# Patient Record
Sex: FEMALE | Race: BLACK OR AFRICAN AMERICAN | Employment: FULL TIME | URBAN - METROPOLITAN AREA
[De-identification: names, ages, dates, MRNs, and addresses within clinical notes are randomized per-mention and may not be internally consistent; named-entity substitution may affect disease eponyms.]

---

## 2017-01-13 ENCOUNTER — OFFICE VISIT (OUTPATIENT)
Dept: ENDOCRINOLOGY | Age: 47
End: 2017-01-13

## 2017-01-13 VITALS
SYSTOLIC BLOOD PRESSURE: 127 MMHG | DIASTOLIC BLOOD PRESSURE: 68 MMHG | BODY MASS INDEX: 33.23 KG/M2 | WEIGHT: 176 LBS | TEMPERATURE: 98.6 F | HEIGHT: 61 IN | RESPIRATION RATE: 16 BRPM | HEART RATE: 74 BPM

## 2017-01-13 DIAGNOSIS — E23.6 EMPTY SELLA SYNDROME (HCC): ICD-10-CM

## 2017-01-13 DIAGNOSIS — E03.9 HYPOTHYROIDISM, UNSPECIFIED TYPE: Primary | ICD-10-CM

## 2017-01-13 DIAGNOSIS — E78.5 HYPERLIPIDEMIA, UNSPECIFIED HYPERLIPIDEMIA TYPE: ICD-10-CM

## 2017-01-13 DIAGNOSIS — R73.03 PREDIABETES: ICD-10-CM

## 2017-01-13 RX ORDER — PRAVASTATIN SODIUM 20 MG/1
20 TABLET ORAL
Qty: 30 TAB | Refills: 6 | Status: SHIPPED | OUTPATIENT
Start: 2017-01-13 | End: 2018-01-05 | Stop reason: ALTCHOICE

## 2017-01-13 RX ORDER — LOSARTAN POTASSIUM 25 MG/1
TABLET ORAL
Refills: 3 | COMMUNITY
Start: 2016-12-29 | End: 2020-03-05 | Stop reason: SDUPTHER

## 2017-01-13 NOTE — MR AVS SNAPSHOT
Visit Information Date & Time Provider Department Dept. Phone Encounter #  
 1/13/2017  9:00 AM Mauricio Cali MD [facility] Diabetes & Endocrinology [phone] [id number] Follow-up Instructions Return in about 6 months (around 7/13/2017). Upcoming Health Maintenance Date Due DTaP/Tdap/Td series (1 - Tdap) 11/8/1993 PAP AKA CERVICAL CYTOLOGY 11/8/1993 INFLUENZA AGE 9 TO ADULT 8/1/2016 Allergies as of 1/13/2017  Review Complete On: 1/13/2017 By: Mauricio Cali MD  
  
 Severity Noted Reaction Type Reactions Imitrex [Sumatriptan Succinate]  06/20/2013    Swelling  
 tongue Current Immunizations  Never Reviewed No immunizations on file. Not reviewed this visit Vitals BP Pulse Temp Resp Height(growth percentile) Weight(growth percentile) 127/68 (BP 1 Location: Right arm, BP Patient Position: Sitting) 74 98.6 °F (37 °C) (Oral) 16 5' 1\" (1.549 m) 176 lb (79.8 kg) BMI OB Status Smoking Status 33.25 kg/m2 Unknown Never Smoker BMI and BSA Data Body Mass Index Body Surface Area  
 33.25 kg/m 2 1.85 m 2 Preferred Pharmacy Pharmacy Name Phone 310 [facility], [place] [address] [address] ([address] [phone] Your Updated Medication List  
  
   
This list is accurate as of: 1/13/17  9:33 AM.  Always use your most recent med list.  
  
  
  
  
 furosemide 40 mg tablet Commonly known as:  LASIX Take 1 Tab by mouth daily. levothyroxine 112 mcg tablet Commonly known as:  SYNTHROID Take 1 Tab by mouth Daily (before breakfast). STOP 125 MCG  
  
 losartan 25 mg tablet Commonly known as:  COZAAR TK 1 T PO  QD  
  
 metoprolol succinate 25 mg XL tablet Commonly known as:  TOPROL-XL Take 1 Tab by mouth daily. multivitamin tablet Commonly known as:  ONE A DAY Take 1 Tab by mouth daily. pravastatin 20 mg tablet Commonly known as:  PRAVACHOL Take 1 Tab by mouth nightly. Follow-up Instructions Return in about 6 months (around 7/13/2017). Patient Instructions   
 
 
 pravachol 20 mg at night Cozaar 25 mg at night Continue on synthrodi 112 mcg a day Introducing [facility] & [facility] SERVICES! Dear Alexx Espinosa: Thank you for requesting a mySugr account. Our records indicate that you already have an active mySugr account. You can access your account anytime at [email or web address] Did you know that you can access your hospital and ER discharge instructions at any time in mySugr? You can also review all of your test results from your hospital stay or ER visit. Additional Information If you have questions, please visit the Frequently Asked Questions section of the mySugr website at [email or web address] Remember, mySugr is NOT to be used for urgent needs. For medical emergencies, dial 911. Now available from your iPhone and Android! Please provide this summary of care documentation to your next provider. Your primary care clinician is listed as Mauricio Guerrero. If you have any questions after today's visit, please call [phone].

## 2017-01-13 NOTE — PROGRESS NOTES
HISTORY OF PRESENT ILLNESS   Raman Tovar is a 42 y.o. female. 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 Singh Persons  She could nto continue on herceptin because of CHF  No hospitalizations     tolerating 112 mcg a day   She stopped the [facility] treatments    She other wise feels good     Weight gain of 11  lb   Not exercising     compliant with meds   Tolerating them with no issues         Review of Systems   Constitutional: Negative. HENT: Negative. Eyes: Negative for pain and redness. Respiratory: Negative. Cardiovascular: Negative for chest pain, palpitations and leg swelling. Gastrointestinal: Negative. Negative for constipation. Genitourinary: Negative. Musculoskeletal: Negative for myalgias. Skin: Negative. Neurological: Negative. Endo/Heme/Allergies: Negative. Psychiatric/Behavioral: Negative for depression and memory loss. The patient does not have insomnia. Physical Exam   Constitutional: She is oriented to person, place, and time. She appears well-developed and well-nourished. HENT:   Head: Normocephalic. Eyes: Conjunctivae and extraocular motions are normal. Pupils are equal, round, and reactive to light. Neck: Normal range of motion. Neck supple. No JVD present. No tracheal deviation present. No thyromegaly present. Cardiovascular: Normal rate, regular rhythm and normal heart sounds. No murmur heard. Pulmonary/Chest: Breath sounds normal.   Abdominal: Soft. Bowel sounds are normal.   Musculoskeletal: Normal range of motion. Lymphadenopathy:   She has no cervical adenopathy. Neurological: She is alert and oriented to person, place, and time. She has normal reflexes. ASSESSMENT AND PLAN:       1. 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  She claims compliance with synthroid and her labs are slightly off ... reaching sub-clinical hyperthyroidism   As she has this condition, will ignore and maintain the same dose   She will continue on Synthroid 125 mcg. - suggesting to [facility] . She does have suppressed TSH, but continuing on same dose   Since she got the reduced dose of 112 mcg she is doing fine, she has no palpitations , continuing on the same dose         b. Hypogonadism. The patient had no help on estrace 1 mg and prometrium 100 mg(changed as pt expressed that ocps remind her of fertility issues) in place of Amberly Vania ,   so she changed to another OCP- generic quan , she stopped it  for fear of DVT      c. Growth hormone deficiency. She stopped it as she is diagnosed with Breast cancer sept 2015       D. ACTH stim test- she passed  The test  indicating no adrenal insufficiency   DIZZY SPELLS- disappeared on their own       2. CHF - from hirceptin  On toprol xl   F/u with Dr. Pedro Moss      3. osteopenia ; and she is off  OCP   Continue on calcium supplementation. s/p toe fracture on left pinky toe     4,. Hyperlipidemia : there is a 30 point improvement   On flax seed and compliance with  pravachol 20 mg at night encouraged            5. Breast cancer , left side, lumpectomy   BRCA positive stage 1  Had  local irradiation   She finished chemo   Being HR positive, she could not take herceptin because of CHF   No Lymphnodes          6. Pre diabetic : a1c is  6 %    ? stereoids ?  Stress     > 50 % of time is spent on counseling         Labs bnv

## 2017-01-13 NOTE — PROGRESS NOTES
Wt Readings from Last 3 Encounters:   01/13/17 176 lb (79.8 kg)   06/30/16 165 lb (74.8 kg)   01/08/16 174 lb (78.9 kg)     Temp Readings from Last 3 Encounters:   01/13/17 98.6 °F (37 °C) (Oral)   06/30/16 97.4 °F (36.3 °C) (Oral)   01/08/16 98.8 °F (37.1 °C) (Oral)     BP Readings from Last 3 Encounters:   01/13/17 127/68   06/30/16 109/78   01/08/16 136/89     Pulse Readings from Last 3 Encounters:   01/13/17 74   06/30/16 100   01/08/16 69     Lab Results   Component Value Date/Time    Hemoglobin A1c 6.0 01/05/2017 08:19 AM       Order placed for pt,  per Verbal Order with read back from Dr Kimmy Cason on 1/13/2017.

## 2017-01-14 LAB
CHOLEST SERPL-MCNC: 242 MG/DL (ref 100–199)
HDLC SERPL-MCNC: 84 MG/DL
INTERPRETATION, 910389: NORMAL
LDLC SERPL CALC-MCNC: 138 MG/DL (ref 0–99)
TRIGL SERPL-MCNC: 98 MG/DL (ref 0–149)
VLDLC SERPL CALC-MCNC: 20 MG/DL (ref 5–40)

## 2017-09-07 ENCOUNTER — OP HISTORICAL/CONVERTED ENCOUNTER (OUTPATIENT)
Dept: OTHER | Age: 47
End: 2017-09-07

## 2017-12-22 ENCOUNTER — TELEPHONE (OUTPATIENT)
Dept: ENDOCRINOLOGY | Age: 47
End: 2017-12-22

## 2017-12-22 DIAGNOSIS — E78.5 HYPERLIPIDEMIA, UNSPECIFIED HYPERLIPIDEMIA TYPE: ICD-10-CM

## 2017-12-22 DIAGNOSIS — E23.6 EMPTY SELLA SYNDROME (HCC): ICD-10-CM

## 2017-12-22 DIAGNOSIS — E03.8 OTHER SPECIFIED HYPOTHYROIDISM: ICD-10-CM

## 2017-12-22 DIAGNOSIS — R73.03 PREDIABETES: ICD-10-CM

## 2017-12-22 DIAGNOSIS — E23.0 GHD (GROWTH HORMONE DEFICIENCY) (HCC): Primary | ICD-10-CM

## 2018-01-05 ENCOUNTER — OFFICE VISIT (OUTPATIENT)
Dept: ENDOCRINOLOGY | Age: 48
End: 2018-01-05

## 2018-01-05 VITALS
TEMPERATURE: 97.8 F | WEIGHT: 181 LBS | RESPIRATION RATE: 16 BRPM | BODY MASS INDEX: 34.17 KG/M2 | OXYGEN SATURATION: 99 % | SYSTOLIC BLOOD PRESSURE: 149 MMHG | HEIGHT: 61 IN | HEART RATE: 70 BPM | DIASTOLIC BLOOD PRESSURE: 85 MMHG

## 2018-01-05 DIAGNOSIS — I10 ESSENTIAL HYPERTENSION: ICD-10-CM

## 2018-01-05 DIAGNOSIS — E23.6 EMPTY SELLA SYNDROME (HCC): Primary | ICD-10-CM

## 2018-01-05 DIAGNOSIS — E78.5 HYPERLIPIDEMIA, UNSPECIFIED HYPERLIPIDEMIA TYPE: ICD-10-CM

## 2018-01-05 DIAGNOSIS — L68.0 HIRSUTISM: ICD-10-CM

## 2018-01-05 RX ORDER — ROSUVASTATIN CALCIUM 10 MG/1
10 TABLET, COATED ORAL
Qty: 30 TAB | Refills: 6 | Status: SHIPPED | OUTPATIENT
Start: 2018-01-05 | End: 2018-10-30 | Stop reason: SDUPTHER

## 2018-01-05 RX ORDER — MELATONIN
DAILY
COMMUNITY
End: 2021-11-01

## 2018-01-05 RX ORDER — SPIRONOLACTONE 25 MG/1
25 TABLET ORAL DAILY
Qty: 60 TAB | Refills: 6 | Status: SHIPPED | OUTPATIENT
Start: 2018-01-05 | End: 2018-10-30 | Stop reason: ALTCHOICE

## 2018-01-05 RX ORDER — LEVOTHYROXINE SODIUM 112 UG/1
TABLET ORAL
Qty: 30 TAB | Refills: 6 | Status: SHIPPED | OUTPATIENT
Start: 2018-01-05 | End: 2018-10-30 | Stop reason: SDUPTHER

## 2018-01-05 NOTE — MR AVS SNAPSHOT
Visit Information Date & Time Provider Department Dept. Phone Encounter #  
 1/5/2018  9:45 AM Chick Lombard, MD [facility] Diabetes & Endocrinology [phone] [id number] Follow-up Instructions Return in about 6 months (around 7/5/2018). Upcoming Health Maintenance Date Due DTaP/Tdap/Td series (1 - Tdap) 11/8/1993 PAP AKA CERVICAL CYTOLOGY 11/8/1993 Influenza Age 5 to Adult 8/1/2017 Allergies as of 1/5/2018  Review Complete On: 1/5/2018 By: Chick Lombard, MD  
  
 Severity Noted Reaction Type Reactions Imitrex [Sumatriptan Succinate]  06/20/2013    Swelling  
 tongue Current Immunizations  Never Reviewed No immunizations on file. Not reviewed this visit You Were Diagnosed With   
  
 Codes Comments Empty sella syndrome ([facility]    -  Primary ICD-10-CM: E23.6 ICD-9-CM: 253.8 Hyperlipidemia, unspecified hyperlipidemia type     ICD-10-CM: E78.5 ICD-9-CM: 272.4 Essential hypertension     ICD-10-CM: I10 
ICD-9-CM: 401.9 Hirsutism     ICD-10-CM: L68.0 ICD-9-CM: 704.1 Vitals BP Pulse Temp Resp Height(growth percentile) Weight(growth percentile) 149/85 (BP 1 Location: Right arm, BP Patient Position: Sitting) 70 97.8 °F (36.6 °C) (Oral) 16 5' 1\" (1.549 m) 181 lb (82.1 kg) SpO2 BMI OB Status Smoking Status 99% 34.2 kg/m2 Unknown Never Smoker BMI and BSA Data Body Mass Index Body Surface Area  
 34.2 kg/m 2 1.88 m 2 Preferred Pharmacy Pharmacy Name Phone 310 [facility] 53 91 [facility] OF [address] [place] [address] ([address] [phone] Your Updated Medication List  
  
   
This list is accurate as of: 1/5/18 11:06 AM.  Always use your most recent med list.  
  
  
  
  
 furosemide 40 mg tablet Commonly known as:  LASIX Take 1 Tab by mouth daily. levothyroxine 112 mcg tablet Commonly known as:  SYNTHROID  
 TAKE 1 TABLET BY MOUTH EVERY DAY BEFORE BREAKFAST  
  
 losartan 25 mg tablet Commonly known as:  COZAAR TK 1 T PO  QD  
  
 metoprolol succinate 25 mg XL tablet Commonly known as:  TOPROL-XL Take 1 Tab by mouth daily. multivitamin tablet Commonly known as:  ONE A DAY Take 1 Tab by mouth daily. rosuvastatin 10 mg tablet Commonly known as:  CRESTOR Take 1 Tab by mouth nightly. Stop pravachol  
  
 spironolactone 25 mg tablet Commonly known as:  ALDACTONE Take 1 Tab by mouth daily. VITAMIN D3 1,000 unit tablet Generic drug:  cholecalciferol Take  by mouth daily. Prescriptions Sent to Pharmacy Refills  
 rosuvastatin (CRESTOR) 10 mg tablet 6 Sig: Take 1 Tab by mouth nightly. Stop pravachol Class: Normal  
 Pharmacy: [address] #: [phone] Route: Oral  
 spironolactone (ALDACTONE) 25 mg tablet 6 Sig: Take 1 Tab by mouth daily. Class: Normal  
 Pharmacy: [address] Ph #: [phone] Route: Oral  
  
Follow-up Instructions Return in about 6 months (around 7/5/2018). Patient Instructions Salivary swabs at 11 pm  
----------------------------------------------------------------------------------------- Start on spironolactone 25 mg twice a day  
 
 
 
 change pravachol  To  crestor 10 mg at night Cozaar 25 mg at night Continue on synthrodi 112 mcg a day  
on empty stomach with water only, no other meds or food or drinks   For next half hour Take any kind of vitamins, calcium, iron   Pills  4 hours later Introducing [facility] & HEALTH SERVICES! Dear Lizzy Rosas: Thank you for requesting a YODIL account. Our records indicate that you already have an active YODIL account.   You can access your account anytime at [email or web address] Did you know that you can access your hospital and ER discharge instructions at any time in Classkick? You can also review all of your test results from your hospital stay or ER visit. Additional Information If you have questions, please visit the Frequently Asked Questions section of the Classkick website at [email or web address] Remember, Classkick is NOT to be used for urgent needs. For medical emergencies, dial 911. Now available from your iPhone and Android! Please provide this summary of care documentation to your next provider. Your primary care clinician is listed as [facility] Ivana. If you have any questions after today's visit, please call [phone].

## 2018-01-05 NOTE — PROGRESS NOTES
HISTORY OF PRESENT ILLNESS   Trevor Bacon is a 41 y.o. female. HPI   F/u visit for pan hypo pituitarism after jan 2017    She got diagnosed with breast cancer, Finished chemo and radiation   She f/u with Dr. Vernona Krabbe  She could nto continue on herceptin because of CHF ( sort of complete pre [facility] studies )  No hospitalizations     tolerating 112 mcg a day   She stopped the [address] treatments    She other wise feels good     Weight gain of 5  lb   Not exercising     compliant with meds   Tolerating them with no issues         Review of Systems   Constitutional: Negative. HENT: Negative. Eyes: Negative for pain and redness. Respiratory: Negative. Cardiovascular: Negative for chest pain, palpitations and leg swelling. Gastrointestinal: Negative. Negative for constipation. Genitourinary: Negative. Musculoskeletal: Negative for myalgias. Skin: Negative. Neurological: Negative. Endo/Heme/Allergies: Negative. Psychiatric/Behavioral: Negative for depression and memory loss. The patient does not have insomnia. Physical Exam   Constitutional: She is oriented to person, place, and time. She appears well-developed and well-nourished. HENT:   Head: Normocephalic. Eyes: Conjunctivae and extraocular motions are normal. Pupils are equal, round, and reactive to light. Neck: Normal range of motion. Neck supple. No JVD present. No tracheal deviation present. No thyromegaly present. Cardiovascular: Normal rate, regular rhythm and normal heart sounds. No murmur heard. Pulmonary/Chest: Breath sounds normal.   Abdominal: Soft. Bowel sounds are normal.   Musculoskeletal: Normal range of motion. Lymphadenopathy:   She has no cervical adenopathy. Neurological: She is alert and oriented to person, place, and time. She has normal reflexes. Reviewed labs       ASSESSMENT AND PLAN:       1. 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  She claims compliance with synthroid and her labs are slightly off . .. reaching sub-clinical hyperthyroidism   As she has this condition, will ignore and maintain the same dose   She will continue on Synthroid 125 mcg. - suggesting to [facility] . She does have suppressed TSH, but continuing on same dose   Since she got the reduced dose of 112 mcg she is doing fine, she has no palpitations , continuing on the same dose         b. Hypogonadism. The patient had no help on estrace 1 mg and prometrium 100 mg(changed as pt expressed that ocps remind her of fertility issues) in place of Alphonza Andreina ,   so she changed to another OCP- generic quan , she stopped it  for fear of DVT      c. Growth hormone deficiency. She stopped it as she is diagnosed with Breast cancer sept 2015       D. ACTH stim test- she passed  The test  indicating no adrenal insufficiency   DIZZY SPELLS- disappeared on their own       2. CHF - from hirceptin  On toprol xl   F/u with Dr. Cain Mao at [facility]       3. osteopenia ; and she is off  OCP   Continue on calcium supplementation. s/p toe fracture on left pinky toe       4,. Hyperlipidemia : off   Recommending change to crestor 10 mg at night   On flax seed and compliance with  pravachol 20 mg at night encouraged            5. Breast cancer , left side, lumpectomy   BRCA positive stage 1  Had  local irradiation   She finished chemo   Being HR positive, she could not take herceptin because of CHF   No Lymphnodes          6. Pre diabetic : a1c is  6 %    ? stereoids ? Stress         7.  BRCA 2 positive - going for ENRIQUE and BSLO      8. Hirsutism : on spironolactone       Labs bnv  Rule out cushings disease    > 50 % of time is spent on counseling   Patient voiced understanding her plan of care

## 2018-01-05 NOTE — PATIENT INSTRUCTIONS
Salivary swabs at 11 pm   -----------------------------------------------------------------------------------------    Start on spironolactone 25 mg twice a day          change pravachol  To  crestor 10 mg at night       Cozaar 25 mg at night      Continue on synthrodi 112 mcg a day   on empty stomach with water only, no other meds or food or drinks   For next half hour       Take any kind of vitamins, calcium, iron   Pills  4 hours later

## 2018-01-18 DIAGNOSIS — I10 ESSENTIAL HYPERTENSION: ICD-10-CM

## 2018-01-18 DIAGNOSIS — L68.0 HIRSUTISM: ICD-10-CM

## 2018-01-18 DIAGNOSIS — E23.6 EMPTY SELLA SYNDROME (HCC): ICD-10-CM

## 2018-01-18 DIAGNOSIS — E78.5 HYPERLIPIDEMIA, UNSPECIFIED HYPERLIPIDEMIA TYPE: ICD-10-CM

## 2018-01-27 LAB
CORTIS BS SAL-MCNC: 0.03 UG/DL
CORTIS SP1 P CHAL SAL-MCNC: 0.01 UG/DL

## 2018-07-05 ENCOUNTER — TELEPHONE (OUTPATIENT)
Dept: ENDOCRINOLOGY | Age: 48
End: 2018-07-05

## 2018-07-05 DIAGNOSIS — L68.0 HIRSUTISM: ICD-10-CM

## 2018-07-05 DIAGNOSIS — R73.03 PREDIABETES: ICD-10-CM

## 2018-07-05 DIAGNOSIS — E78.5 HYPERLIPIDEMIA, UNSPECIFIED HYPERLIPIDEMIA TYPE: Primary | ICD-10-CM

## 2018-07-05 NOTE — TELEPHONE ENCOUNTER
----- Message from [facility] sent at 7/5/2018  1:47 PM EDT -----  Regarding: Dr. Pillo Gonzalez  Pt is requesting the office to r/s both her lab and f/u appt with Dr. Emelina Conteh?  Pt best contact [phone]

## 2018-07-05 NOTE — TELEPHONE ENCOUNTER
Lab orders available for  at     Order placed for pt,  per Verbal Order with read back from Dr Keyon Garcia on 7/5/2018.

## 2018-07-09 ENCOUNTER — OP HISTORICAL/CONVERTED ENCOUNTER (OUTPATIENT)
Dept: OTHER | Age: 48
End: 2018-07-09

## 2018-09-28 ENCOUNTER — OP HISTORICAL/CONVERTED ENCOUNTER (OUTPATIENT)
Dept: OTHER | Age: 48
End: 2018-09-28

## 2018-10-23 ENCOUNTER — OP HISTORICAL/CONVERTED ENCOUNTER (OUTPATIENT)
Dept: OTHER | Age: 48
End: 2018-10-23

## 2018-10-30 ENCOUNTER — OFFICE VISIT (OUTPATIENT)
Dept: ENDOCRINOLOGY | Age: 48
End: 2018-10-30

## 2018-10-30 VITALS
WEIGHT: 179.4 LBS | SYSTOLIC BLOOD PRESSURE: 143 MMHG | OXYGEN SATURATION: 98 % | RESPIRATION RATE: 18 BRPM | BODY MASS INDEX: 33.87 KG/M2 | TEMPERATURE: 98 F | HEART RATE: 65 BPM | DIASTOLIC BLOOD PRESSURE: 80 MMHG | HEIGHT: 61 IN

## 2018-10-30 DIAGNOSIS — E23.6 EMPTY SELLA SYNDROME (HCC): ICD-10-CM

## 2018-10-30 DIAGNOSIS — E23.6 EMPTY SELLA SYNDROME (HCC): Primary | ICD-10-CM

## 2018-10-30 DIAGNOSIS — E03.9 HYPOTHYROIDISM, UNSPECIFIED TYPE: ICD-10-CM

## 2018-10-30 DIAGNOSIS — L68.0 HIRSUTISM: ICD-10-CM

## 2018-10-30 DIAGNOSIS — M85.80 OSTEOPENIA, UNSPECIFIED LOCATION: ICD-10-CM

## 2018-10-30 DIAGNOSIS — E78.2 MIXED HYPERLIPIDEMIA: ICD-10-CM

## 2018-10-30 DIAGNOSIS — E78.5 HYPERLIPIDEMIA, UNSPECIFIED HYPERLIPIDEMIA TYPE: ICD-10-CM

## 2018-10-30 DIAGNOSIS — I10 ESSENTIAL HYPERTENSION: ICD-10-CM

## 2018-10-30 DIAGNOSIS — E23.0 GHD (GROWTH HORMONE DEFICIENCY) (HCC): ICD-10-CM

## 2018-10-30 RX ORDER — LEVOTHYROXINE SODIUM 112 UG/1
TABLET ORAL
Qty: 90 TAB | Refills: 4 | Status: SHIPPED | OUTPATIENT
Start: 2018-10-30 | End: 2020-01-18

## 2018-10-30 RX ORDER — ROSUVASTATIN CALCIUM 10 MG/1
10 TABLET, COATED ORAL
Qty: 90 TAB | Refills: 4 | Status: SHIPPED | OUTPATIENT
Start: 2018-10-30 | End: 2020-01-18

## 2018-10-30 NOTE — PROGRESS NOTES
1. Have you been to the ER, urgent care clinic since your last visit? Hospitalized since your last visit? Yes June 21st at [facility] [place] for broken ankle      2. Have you seen or consulted any other health care providers outside of the [facility] since your last visit? Include any pap smears or colon screening.  No      Chief Complaint   Patient presents with    Thyroid Problem     follow up-patient states she had a few missed appoitments     Wt Readings from Last 3 Encounters:   10/30/18 179 lb 6.4 oz (81.4 kg)   01/05/18 181 lb (82.1 kg)   01/13/17 176 lb (79.8 kg)     Temp Readings from Last 3 Encounters:   10/30/18 98 °F (36.7 °C) (Oral)   01/05/18 97.8 °F (36.6 °C) (Oral)   01/13/17 98.6 °F (37 °C) (Oral)     BP Readings from Last 3 Encounters:   10/30/18 143/80   01/05/18 149/85   01/13/17 127/68     Pulse Readings from Last 3 Encounters:   10/30/18 65   01/05/18 70   01/13/17 74

## 2018-10-30 NOTE — PATIENT INSTRUCTIONS
Stop spironolactone  crestor 10 mg at night   Cozaar 25 mg at night      --------------------------------------------------------------------------------------------------------------------    Continue on synthrodi 112 mcg a day   on empty stomach with water only, no other meds or food or drinks   For next half hour       Take any kind of vitamins, calcium, iron   Pills  4 hours later

## 2018-10-30 NOTE — PROGRESS NOTES
HISTORY OF PRESENT ILLNESS   Pramod Graf is a 41 y.o. female. HPI   F/u visit for pan hypo pituitarism after jan 2018         She had broken ankle , right side   She fell at work     Had to wait until she healed up           Old history :    She got diagnosed with breast cancer, Finished chemo and radiation   She f/u with Dr. Glendy Rayo  She could nto continue on herceptin because of CHF ( sort of complete pre [facility] studies )  No hospitalizations     tolerating 112 mcg a day   She [facility] the [address]    She other wise feels good     Weight gain of 5  lb   Not exercising     compliant with meds   Tolerating them with no issues         Review of Systems   Constitutional: Negative. HENT: Negative. Eyes: Negative for pain and redness. Respiratory: Negative. Cardiovascular: Negative for chest pain, palpitations and leg swelling. Gastrointestinal: Negative. Negative for constipation. Genitourinary: Negative. Musculoskeletal: Negative for myalgias. Skin: Negative. Neurological: Negative. Endo/Heme/Allergies: Negative. Psychiatric/Behavioral: Negative for depression and memory loss. The patient does not have insomnia. Physical Exam   Constitutional: She is oriented to person, place, and time. She appears well-developed and well-nourished. HENT:   Head: Normocephalic. Eyes: Conjunctivae and extraocular motions are normal. Pupils are equal, round, and reactive to light. Neck: Normal range of motion. Neck supple. No JVD present. No tracheal deviation present. No thyromegaly present. Cardiovascular: Normal rate, regular rhythm and normal heart sounds. No murmur heard. Pulmonary/Chest: Breath sounds normal.   Abdominal: Soft. Bowel sounds are normal.   Musculoskeletal: Normal range of motion. Lymphadenopathy:   She has no cervical adenopathy. Neurological: She is alert and oriented to person, place, and time. She has normal reflexes.        Reviewed labs       ASSESSMENT AND PLAN:       1. 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 She claims compliance with synthroid and her labs are slightly off . .. reaching sub-clinical hyperthyroidism   As she has this condition, will ignore and maintain the same dose   She will continue on Synthroid 125 mcg. - suggesting to [facility] . She does have suppressed TSH, but continuing on same dose   Since she got the reduced dose of 112 mcg she is doing fine, she has no palpitations , continuing on the same dose         b. Hypogonadism. The patient had no help on estrace 1 mg and prometrium 100 mg(changed as pt expressed that ocps remind her of fertility issues) in place of Kami Kristin ,   so she changed to another OCP- generic quan , she stopped it  for fear of DVT  And ofcourse never could get back because of breast cancer      c. Growth hormone deficiency. She stopped it as she is diagnosed with Breast cancer sept 2015       D. ACTH stim test- she passed  The test  indicating no adrenal insufficiency   DIZZY SPELLS- disappeared on their own       2. CHF - from hirceptin  On toprol xl   F/u with [facility]. Vijaya Vega at [facility]       3. osteopenia ; and she is off  OCP   Continue on calcium supplementation. s/p toe fracture on left pinky toe  (Traumatic )  S/p ankle fracture on right side ( had a fall )        4,. Hyperlipidemia : off   on crestor 10 mg at night   On flax seed and compliance with  pravachol 20 mg at night encouraged            5. Breast cancer , left side, lumpectomy   BRCA positive stage 1  Had  local irradiation   She finished chemo   Being HR positive, she could not take herceptin because of CHF   No Lymphnodes          6. Pre diabetic : a1c is  6 %    ? stereoids ? Stress         7.  BRCA 2 positive - going for ENRIQUE and BSLO      8. Hirsutism : on spironolactone       Labs bnv      > 50 % of time is spent on counseling   Patient voiced understanding her plan of care

## 2019-06-07 ENCOUNTER — OP HISTORICAL/CONVERTED ENCOUNTER (OUTPATIENT)
Dept: OTHER | Age: 49
End: 2019-06-07

## 2019-12-31 ENCOUNTER — OP HISTORICAL/CONVERTED ENCOUNTER (OUTPATIENT)
Dept: OTHER | Age: 49
End: 2019-12-31

## 2020-01-18 DIAGNOSIS — E23.6 EMPTY SELLA SYNDROME (HCC): ICD-10-CM

## 2020-01-18 DIAGNOSIS — L68.0 HIRSUTISM: ICD-10-CM

## 2020-01-18 DIAGNOSIS — I10 ESSENTIAL HYPERTENSION: ICD-10-CM

## 2020-01-18 DIAGNOSIS — E78.5 HYPERLIPIDEMIA, UNSPECIFIED HYPERLIPIDEMIA TYPE: ICD-10-CM

## 2020-01-18 RX ORDER — ROSUVASTATIN CALCIUM 10 MG/1
10 TABLET, COATED ORAL
Qty: 90 TAB | Refills: 4 | Status: SHIPPED | OUTPATIENT
Start: 2020-01-18 | End: 2021-04-30 | Stop reason: SDUPTHER

## 2020-01-18 RX ORDER — LEVOTHYROXINE SODIUM 112 UG/1
TABLET ORAL
Qty: 90 TAB | Refills: 4 | Status: SHIPPED | OUTPATIENT
Start: 2020-01-18 | End: 2021-04-30 | Stop reason: SDUPTHER

## 2020-02-14 ENCOUNTER — TELEPHONE (OUTPATIENT)
Dept: ENDOCRINOLOGY | Age: 50
End: 2020-02-14

## 2020-02-14 DIAGNOSIS — E23.0 GHD (GROWTH HORMONE DEFICIENCY) (HCC): ICD-10-CM

## 2020-02-14 DIAGNOSIS — E23.6 EMPTY SELLA SYNDROME (HCC): Primary | ICD-10-CM

## 2020-02-14 DIAGNOSIS — E78.5 HYPERLIPIDEMIA, UNSPECIFIED HYPERLIPIDEMIA TYPE: ICD-10-CM

## 2020-02-14 DIAGNOSIS — L68.0 HIRSUTISM: ICD-10-CM

## 2020-02-14 DIAGNOSIS — I10 ESSENTIAL HYPERTENSION: ICD-10-CM

## 2020-02-18 ENCOUNTER — APPOINTMENT (OUTPATIENT)
Dept: ENDOCRINOLOGY | Age: 50
End: 2020-02-18

## 2020-02-18 DIAGNOSIS — I10 ESSENTIAL HYPERTENSION: ICD-10-CM

## 2020-02-18 DIAGNOSIS — L68.0 HIRSUTISM: ICD-10-CM

## 2020-02-18 DIAGNOSIS — E23.6 EMPTY SELLA SYNDROME (HCC): Primary | ICD-10-CM

## 2020-02-18 DIAGNOSIS — E78.5 HYPERLIPIDEMIA, UNSPECIFIED HYPERLIPIDEMIA TYPE: ICD-10-CM

## 2020-02-18 DIAGNOSIS — E23.0 GHD (GROWTH HORMONE DEFICIENCY) (HCC): ICD-10-CM

## 2020-02-19 LAB
ALBUMIN SERPL-MCNC: 4.8 G/DL (ref 3.8–4.8)
ALBUMIN/GLOB SERPL: 1.5 {RATIO} (ref 1.2–2.2)
ALP SERPL-CCNC: 72 IU/L (ref 39–117)
ALT SERPL-CCNC: 12 IU/L (ref 0–32)
AST SERPL-CCNC: 15 IU/L (ref 0–40)
BILIRUB SERPL-MCNC: 0.3 MG/DL (ref 0–1.2)
BUN SERPL-MCNC: 15 MG/DL (ref 6–24)
BUN/CREAT SERPL: 18 (ref 9–23)
CALCIUM SERPL-MCNC: 9.8 MG/DL (ref 8.7–10.2)
CHLORIDE SERPL-SCNC: 107 MMOL/L (ref 96–106)
CHOLEST SERPL-MCNC: 157 MG/DL (ref 100–199)
CO2 SERPL-SCNC: 20 MMOL/L (ref 20–29)
CREAT SERPL-MCNC: 0.84 MG/DL (ref 0.57–1)
EST. AVERAGE GLUCOSE BLD GHB EST-MCNC: 108 MG/DL
GH SERPL-MCNC: <0.1 NG/ML (ref 0–10)
GLOBULIN SER CALC-MCNC: 3.3 G/DL (ref 1.5–4.5)
GLUCOSE SERPL-MCNC: 83 MG/DL (ref 65–99)
HBA1C MFR BLD: 5.4 % (ref 4.8–5.6)
HDLC SERPL-MCNC: 69 MG/DL
INTERPRETATION, 910389: NORMAL
LDLC SERPL CALC-MCNC: 78 MG/DL (ref 0–99)
POTASSIUM SERPL-SCNC: 4.5 MMOL/L (ref 3.5–5.2)
PROT SERPL-MCNC: 8.1 G/DL (ref 6–8.5)
SODIUM SERPL-SCNC: 142 MMOL/L (ref 134–144)
T4 FREE SERPL-MCNC: 1.34 NG/DL (ref 0.82–1.77)
TRIGL SERPL-MCNC: 48 MG/DL (ref 0–149)
TSH SERPL DL<=0.005 MIU/L-ACNC: 0.11 UIU/ML (ref 0.45–4.5)
VLDLC SERPL CALC-MCNC: 10 MG/DL (ref 5–40)

## 2020-03-05 ENCOUNTER — OFFICE VISIT (OUTPATIENT)
Dept: ENDOCRINOLOGY | Age: 50
End: 2020-03-05

## 2020-03-05 VITALS
SYSTOLIC BLOOD PRESSURE: 131 MMHG | BODY MASS INDEX: 34.21 KG/M2 | DIASTOLIC BLOOD PRESSURE: 85 MMHG | TEMPERATURE: 96.6 F | WEIGHT: 181.2 LBS | OXYGEN SATURATION: 100 % | HEIGHT: 61 IN | HEART RATE: 67 BPM | RESPIRATION RATE: 18 BRPM

## 2020-03-05 DIAGNOSIS — E78.5 HYPERLIPIDEMIA, UNSPECIFIED HYPERLIPIDEMIA TYPE: ICD-10-CM

## 2020-03-05 DIAGNOSIS — E23.6 EMPTY SELLA SYNDROME (HCC): Primary | ICD-10-CM

## 2020-03-05 DIAGNOSIS — M81.0 SENILE OSTEOPOROSIS: ICD-10-CM

## 2020-03-05 DIAGNOSIS — E03.9 HYPOTHYROIDISM, UNSPECIFIED TYPE: ICD-10-CM

## 2020-03-05 DIAGNOSIS — R73.03 PREDIABETES: ICD-10-CM

## 2020-03-05 DIAGNOSIS — E78.2 MIXED HYPERLIPIDEMIA: ICD-10-CM

## 2020-03-05 DIAGNOSIS — E23.6 EMPTY SELLA SYNDROME (HCC): ICD-10-CM

## 2020-03-05 RX ORDER — TOPIRAMATE 50 MG/1
50 TABLET, FILM COATED ORAL 2 TIMES DAILY
COMMUNITY
End: 2020-09-02 | Stop reason: ALTCHOICE

## 2020-03-05 NOTE — LETTER
3/8/20 Patient: Murphy Dean YOB: 1970 Date of Visit: 3/5/2020 Daron Del Cid MD 
[address] [facility] [address] [place] [address] VIA Facsimile: [phone] Dear Daron Del Cid MD, Thank you for referring Ms. Vidal Scruggs to [id number] [place] [address] for evaluation. My notes for this consultation are attached. If you have questions, please do not hesitate to call me. I look forward to following your patient along with you. Sincerely, Holden Espinoza MD

## 2020-03-05 NOTE — PROGRESS NOTES
1. Have you been to the ER, urgent care clinic since your last visit? December 2019/[facility]  Hospitalized since your last visit? No    2. Have you seen or consulted any other health care providers outside of the [address] [place] [address] since your last visit? Include any pap smears or colon screening.  No    Wt Readings from Last 3 Encounters:   03/05/20 181 lb 3.2 oz (82.2 kg)   10/30/18 179 lb 6.4 oz (81.4 kg)   01/05/18 181 lb (82.1 kg)     Temp Readings from Last 3 Encounters:   03/05/20 96.6 °F (35.9 °C) (Oral)   10/30/18 98 °F (36.7 °C) (Oral)   01/05/18 97.8 °F (36.6 °C) (Oral)     BP Readings from Last 3 Encounters:   03/05/20 131/85   10/30/18 143/80   01/05/18 149/85     Pulse Readings from Last 3 Encounters:   03/05/20 67   10/30/18 65   01/05/18 70

## 2020-03-05 NOTE — PROGRESS NOTES
HISTORY OF PRESENT ILLNESS   Sid Nolasco is a 54  y.o. female. 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 Terrence Goldberg  She could nto continue on herceptin because of CHF ( sort of complete pre [facility] studies )  No hospitalizations   tolerating 112 mcg a day   She stopped the [address] treatments  She other wise feels good   Weight gain of 5  lb   Not exercising   compliant with meds   Tolerating them with no issues         Review of Systems   Constitutional: Negative. HENT: Negative. Eyes: Negative for pain and redness. Respiratory: Negative. Cardiovascular: Negative for chest pain, palpitations and leg swelling. Gastrointestinal: Negative. Negative for constipation. Genitourinary: Negative. Musculoskeletal: Negative for myalgias. Skin: Negative. Neurological: Negative. Endo/Heme/Allergies: Negative. Psychiatric/Behavioral: Negative for depression and memory loss. The patient does not have insomnia. Physical Exam   Constitutional: She is oriented to person, place, and time. She appears well-developed and well-nourished. HENT:   Head: Normocephalic. Eyes: Conjunctivae and extraocular motions are normal. Pupils are equal, round, and reactive to light. Neck: Normal range of motion. Neck supple. No JVD present. No tracheal deviation present. No thyromegaly present. Cardiovascular: Normal rate, regular rhythm and normal heart sounds. No murmur heard. Pulmonary/Chest: Breath sounds normal.   Abdominal: Soft. Bowel sounds are normal.   Musculoskeletal: Normal range of motion. Lymphadenopathy:   She has no cervical adenopathy. Neurological: She is alert and oriented to person, place, and time. She has normal reflexes. Reviewed labs       ASSESSMENT AND PLAN:       1. 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 She claims compliance with synthroid and her labs are slightly off . .. reaching sub-clinical hyperthyroidism   As she has this condition, will ignore and maintain the same dose   She will continue on Synthroid 125 mcg. - suggesting to [facility] . She does have suppressed TSH, but continuing on same dose   Since she got the reduced dose of 112 mcg she is doing fine, she has no palpitations , continuing on the same dose         b. Hypogonadism. The patient had no help on estrace 1 mg and prometrium 100 mg(changed as pt expressed that ocps remind her of fertility issues) in place of [place] ,   so she changed to another OCP- generic quan , she stopped it  for fear of DVT  And ofcourse never could get back because of breast cancer      c. Growth hormone deficiency. She stopped it as she is diagnosed with Breast cancer sept 2015       D. ACTH stim test- she passed  The test  indicating no adrenal insufficiency   DIZZY SPELLS- disappeared on their own       2. CHF - from hirceptin  On toprol xl   F/u with Dr. Sam Gallegos at [facility]       3. osteopenia ; and she is off  OCP   Continue on calcium supplementation. s/p toe fracture on left pinky toe  (Traumatic )  S/p ankle fracture on right side ( had a fall )  dexa ordered   24 hr urine     I am considering I/v reclast         4,. Hyperlipidemia : off   on crestor 10 mg at night   On flax seed and compliance with  pravachol 20 mg at night encouraged            5. Breast cancer , left side, lumpectomy   BRCA positive stage 1  Had  local irradiation   She finished chemo   Being HR positive, she could not take herceptin because of CHF   No Lymphnodes          6. Pre diabetic : a1c is  6 %    ? stereoids ? Stress         7.  BRCA 2 positive - going for ENRIQUE and BSLO      8. Hirsutism : on spironolactone       Labs bnv      > 50 % of time is spent on counseling   Patient voiced understanding her plan of care

## 2020-03-05 NOTE — PATIENT INSTRUCTIONS
crestor 10 mg at night Cozaar 25 mg at night 
 
---------------------------------------------------------------------------- 
 
reclast  5 mg  Intravenous - read up  
-------------------------------------------------------------------------------------------------------------------- Continue on synthrodi 112 mcg a day  
on empty stomach with water only, no other meds or food or drinks   For next half hour Take any kind of vitamins, calcium, iron   Pills  4 hours later 
 
----------------------------------------------------------------------------------------------------- 
 
 
24 hour urine collection instructions for calcium On the day you plan to collect urine 1. Discard first urine sample soon after you get up 2. Start collecting urine samples in the container then on whole first day . Evelyn Patelpool ... 3. until the first sample next day is collected into the jar.

## 2020-03-08 RX ORDER — LOSARTAN POTASSIUM 25 MG/1
TABLET ORAL
Qty: 90 TAB | Refills: 4 | Status: SHIPPED | OUTPATIENT
Start: 2020-03-08 | End: 2021-04-30 | Stop reason: SDUPTHER

## 2020-06-10 LAB — CREATININE, EXTERNAL: 0.78

## 2020-08-03 ENCOUNTER — DOCUMENTATION ONLY (OUTPATIENT)
Dept: ENDOCRINOLOGY | Age: 50
End: 2020-08-03

## 2020-08-04 NOTE — PROGRESS NOTES
[facility]     Date : July 30 2020   Bone DEXA  ap spine T-score - 1.2  ; left femoral Neck T- score  -1.0 , right femoral neck T-score -1.0    Cedric Larson MD

## 2020-08-05 DIAGNOSIS — M81.0 SENILE OSTEOPOROSIS: ICD-10-CM

## 2020-09-02 ENCOUNTER — OFFICE VISIT (OUTPATIENT)
Dept: ENDOCRINOLOGY | Age: 50
End: 2020-09-02
Payer: COMMERCIAL

## 2020-09-02 VITALS
SYSTOLIC BLOOD PRESSURE: 128 MMHG | TEMPERATURE: 98.5 F | DIASTOLIC BLOOD PRESSURE: 82 MMHG | HEART RATE: 70 BPM | WEIGHT: 173.2 LBS | HEIGHT: 61 IN | OXYGEN SATURATION: 100 % | BODY MASS INDEX: 32.7 KG/M2 | RESPIRATION RATE: 18 BRPM

## 2020-09-02 DIAGNOSIS — M81.0 SENILE OSTEOPOROSIS: ICD-10-CM

## 2020-09-02 DIAGNOSIS — E23.6 EMPTY SELLA SYNDROME (HCC): ICD-10-CM

## 2020-09-02 DIAGNOSIS — E23.0 PITUITARY HYPOGONADISM (HCC): ICD-10-CM

## 2020-09-02 DIAGNOSIS — E03.9 HYPOTHYROIDISM, UNSPECIFIED TYPE: Primary | ICD-10-CM

## 2020-09-02 PROCEDURE — 99214 OFFICE O/P EST MOD 30 MIN: CPT | Performed by: INTERNAL MEDICINE

## 2020-09-02 RX ORDER — ALENDRONATE SODIUM 70 MG/1
70 TABLET ORAL
Qty: 12 TAB | Refills: 4 | Status: SHIPPED | OUTPATIENT
Start: 2020-09-02 | End: 2021-04-30 | Stop reason: SINTOL

## 2020-09-02 RX ORDER — CARVEDILOL 6.25 MG/1
TABLET ORAL 2 TIMES DAILY WITH MEALS
COMMUNITY

## 2020-09-02 NOTE — PATIENT INSTRUCTIONS
crestor 10 mg at night Cozaar 25 mg at night 
 
------------------------------------------------------------------------------- 
 synthrodi 112 mcg a day    DNCP  
( on sat - 2 pills of synthroid  And none on sundays   As she is taking forsamax on Sundays) 
on empty stomach with water only, no other meds or food or drinks   For next half hour Take any kind of vitamins, calcium, iron   Pills  4 hours later 
 
---------------------------------------------------------------------------------------------------FOSAMAX  70 mcg  A day, on  sunday empty stomach with water only, no other meds or food or drinks   For next half hour  
 
calcium and vit  Twice a day  
 
-------------------------------------------------------------------------- 
 
 
 
24 hour urine collection instructions for calcium, creat and sodium On the day you plan to collect urine 1. Discard first urine sample soon after you get up 2. Start collecting urine samples in the container then on whole first day . Benjy Aranda ... 3. until the first sample next day is collected into the jar.

## 2020-09-02 NOTE — PROGRESS NOTES
1. Have you been to the ER, urgent care clinic since your last visit? No  Hospitalized since your last visit? No    2. Have you seen or consulted any other health care providers outside of the [address] [place] [address] since your last visit? Include any pap smears or colon screening.  No     Wt Readings from Last 3 Encounters:   09/02/20 173 lb 3.2 oz (78.6 kg)   03/05/20 181 lb 3.2 oz (82.2 kg)   10/30/18 179 lb 6.4 oz (81.4 kg)     Temp Readings from Last 3 Encounters:   09/02/20 98.5 °F (36.9 °C) (Oral)   03/05/20 96.6 °F (35.9 °C) (Oral)   10/30/18 98 °F (36.7 °C) (Oral)     BP Readings from Last 3 Encounters:   09/02/20 128/82   03/05/20 131/85   10/30/18 143/80     Pulse Readings from Last 3 Encounters:   09/02/20 70   03/05/20 67   10/30/18 65

## 2020-09-02 NOTE — PROGRESS NOTES
HISTORY OF PRESENT ILLNESS   Ria Berkowitz is a 54  y.o. female. HPI   F/u visit for pan hypopituitarism after  March 2020    Compliant with synthroid     She has lot of phlegm and she is being evaluated for it         Old history :  She had broken ankle , right side   She fell at work   Had to wait until she healed up         Old history :    She got diagnosed with breast cancer, Finished chemo and radiation   She f/u with Dr. Chanda Bell  She could nto continue on herceptin because of CHF ( sort of complete pre [facility] studies )  No hospitalizations   tolerating 112 mcg a day   She stopped the [address] treatments  She other wise feels good   Weight gain of 5  lb   Not exercising   compliant with meds   Tolerating them with no issues         Review of Systems   Constitutional: Negative. HENT: Negative. Eyes: Negative for pain and redness. Respiratory: Negative. Cardiovascular: Negative for chest pain, palpitations and leg swelling. Gastrointestinal: Negative. Negative for constipation. Genitourinary: Negative. Musculoskeletal: Negative for myalgias. Skin: Negative. Neurological: Negative. Endo/Heme/Allergies: Negative. Psychiatric/Behavioral: Negative for depression and memory loss. The patient does not have insomnia. Physical Exam   Constitutional: She is oriented to person, place, and time. She appears well-developed and well-nourished. HENT:   Head: Normocephalic. Eyes: Conjunctivae and extraocular motions are normal. Pupils are equal, round, and reactive to light. Neck: Normal range of motion. Neck supple. No JVD present. No tracheal deviation present. No thyromegaly present. Cardiovascular: Normal rate, regular rhythm and normal heart sounds. No murmur heard. Pulmonary/Chest: Breath sounds normal.   Abdominal: Soft. Bowel sounds are normal.   Musculoskeletal: Normal range of motion. Lymphadenopathy:   She has no cervical adenopathy.    Neurological: She is alert and oriented to person, place, and time. She has normal reflexes. 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 She claims compliance with synthroid and her labs are slightly off . .. reaching sub-clinical hyperthyroidism   As she has this condition, will ignore and maintain the same dose   She will continue on Synthroid 125 mcg. - suggesting to [facility] . She does have suppressed TSH, but continuing on same dose   Since she got the reduced dose of 112 mcg she is doing fine, she has no palpitations , continuing on the same dose   Sept 2020 - continue on same dose 112 mcg a day         b. Hypogonadism. The patient had no help on estrace 1 mg and prometrium 100 mg(changed as pt expressed that ocps remind her of fertility issues) in place of [place] ,   so she changed to another OCP- generic quan , she stopped it  for fear of DVT  And ofcourse never could get back because of breast cancer      c. Growth hormone deficiency. She stopped it as she is diagnosed with Breast cancer sept 2015       D. ACTH stim test- she passed  The test  indicating no adrenal insufficiency   DIZZY SPELLS- disappeared on their own       2. CHF - from hirceptin  On toprol xl   [facility]yllis [facility]       3. osteopenia ; and she is off  OCP   Continue on calcium supplementation. s/p toe fracture on left pinky toe  (Traumatic )  S/p ankle fracture on right side ( had a fall )    March 2020   t scores - -1.2 ; -1.0     z scores - 2.2  ; On spine   -1.6       I am considering I/v reclast , but patient prefers oral medications   Weekly fosamax is being started         4,.  Hyperlipidemia : off   on crestor 10 mg at night             5. Breast cancer , left side, lumpectomy   F/u with Dr. Kourtney Issa    BRCA positive stage 1  Had  local irradiation   She finished chemo   Being HR positive, she could not take herceptin because of CHF   No Lymphnodes          6. Pre diabetic : a1c is  6 %    ? stereoids ? Stress         7.  BRCA 2 positive - going for ENRIQUE and BSLO      8. Hirsutism : on spironolactone       Labs bnv      > 50 % of time is spent on counseling   Patient voiced understanding her plan of care

## 2020-09-02 NOTE — LETTER
9/2/20 Patient: Jacqueline Martinez YOB: 1970 Date of Visit: 9/2/2020 Cloretta Holter, MD 
[address] [facility] [address] [facility] [address] VIA In Basket Dear Cloretta Holter, MD, Thank you for referring Ms. Dhruv Wahl to [address] [address] for evaluation. My notes for this consultation are attached. If you have questions, please do not hesitate to call me. I look forward to following your patient along with you. Sincerely, Sophie Martinez MD

## 2020-10-08 NOTE — PROGRESS NOTES
The dexa report on her is not 100% reliable because patient is young person. So, the scores used are different than for older people - the Z score .      It says she has weaker bone and requires calcium and vit D use

## 2020-10-08 NOTE — PROGRESS NOTES
Calcium 1000 mg to 1200 mg a day in divided doses   And   Vit D 1000 int units a day  To 1200  A day in divided doses as well

## 2020-10-08 NOTE — PROGRESS NOTES
Spoke to patient. Patient states she spoke to MD at visit and MD already informed her to take Fosamax along with Vitamin D and Calcium. Patient wanted to make sure that MD did not forget that she was already informed at the visit.

## 2020-10-18 LAB
CALCIUM 24H UR-MCNC: 9.8 MG/DL
CALCIUM 24H UR-MRATE: 172 MG/24 HR (ref 47–462)
CREAT 24H UR-MRATE: 1475 MG/24 HR (ref 800–1800)
CREAT UR-MCNC: 84.3 MG/DL
SODIUM 24H UR-SCNC: 122 MMOL/L
SODIUM 24H UR-SRATE: 214 MMOL/24 HR (ref 39–258)

## 2021-03-19 LAB
25(OH)D3+25(OH)D2 SERPL-MCNC: 36.7 NG/ML (ref 30–100)
ALBUMIN SERPL-MCNC: 4.6 G/DL (ref 3.8–4.8)
ALBUMIN/GLOB SERPL: 1.4 {RATIO} (ref 1.2–2.2)
ALP SERPL-CCNC: 75 IU/L (ref 39–117)
ALT SERPL-CCNC: 11 IU/L (ref 0–32)
AST SERPL-CCNC: 17 IU/L (ref 0–40)
BILIRUB SERPL-MCNC: 0.5 MG/DL (ref 0–1.2)
BUN SERPL-MCNC: 21 MG/DL (ref 6–24)
BUN/CREAT SERPL: 27 (ref 9–23)
CALCIUM SERPL-MCNC: 9.8 MG/DL (ref 8.7–10.2)
CHLORIDE SERPL-SCNC: 98 MMOL/L (ref 96–106)
CO2 SERPL-SCNC: 26 MMOL/L (ref 20–29)
CREAT SERPL-MCNC: 0.77 MG/DL (ref 0.57–1)
GLOBULIN SER CALC-MCNC: 3.3 G/DL (ref 1.5–4.5)
GLUCOSE SERPL-MCNC: 90 MG/DL (ref 65–99)
POTASSIUM SERPL-SCNC: 4.1 MMOL/L (ref 3.5–5.2)
PROT SERPL-MCNC: 7.9 G/DL (ref 6–8.5)
SODIUM SERPL-SCNC: 137 MMOL/L (ref 134–144)
T4 FREE SERPL-MCNC: 1.56 NG/DL (ref 0.82–1.77)

## 2021-04-30 ENCOUNTER — OFFICE VISIT (OUTPATIENT)
Dept: ENDOCRINOLOGY | Age: 51
End: 2021-04-30
Payer: COMMERCIAL

## 2021-04-30 VITALS
DIASTOLIC BLOOD PRESSURE: 68 MMHG | BODY MASS INDEX: 32.06 KG/M2 | HEART RATE: 80 BPM | TEMPERATURE: 98.8 F | HEIGHT: 61 IN | SYSTOLIC BLOOD PRESSURE: 120 MMHG | WEIGHT: 169.8 LBS | RESPIRATION RATE: 18 BRPM | OXYGEN SATURATION: 100 %

## 2021-04-30 DIAGNOSIS — L68.0 HIRSUTISM: ICD-10-CM

## 2021-04-30 DIAGNOSIS — E23.0 PITUITARY HYPOGONADISM (HCC): ICD-10-CM

## 2021-04-30 DIAGNOSIS — E03.9 HYPOTHYROIDISM, UNSPECIFIED TYPE: ICD-10-CM

## 2021-04-30 DIAGNOSIS — R73.03 PREDIABETES: ICD-10-CM

## 2021-04-30 DIAGNOSIS — M81.0 SENILE OSTEOPOROSIS: ICD-10-CM

## 2021-04-30 DIAGNOSIS — E23.6 EMPTY SELLA SYNDROME (HCC): Primary | ICD-10-CM

## 2021-04-30 DIAGNOSIS — E78.5 HYPERLIPIDEMIA, UNSPECIFIED HYPERLIPIDEMIA TYPE: ICD-10-CM

## 2021-04-30 DIAGNOSIS — I10 ESSENTIAL HYPERTENSION: ICD-10-CM

## 2021-04-30 PROCEDURE — 99214 OFFICE O/P EST MOD 30 MIN: CPT | Performed by: INTERNAL MEDICINE

## 2021-04-30 RX ORDER — ROSUVASTATIN CALCIUM 10 MG/1
10 TABLET, COATED ORAL
Qty: 90 TAB | Refills: 4 | Status: SHIPPED | OUTPATIENT
Start: 2021-04-30 | End: 2022-05-03

## 2021-04-30 RX ORDER — SPIRONOLACTONE 25 MG/1
TABLET ORAL
Qty: 90 TAB | Refills: 3 | Status: SHIPPED | OUTPATIENT
Start: 2021-04-30 | End: 2021-11-01

## 2021-04-30 RX ORDER — ZOLEDRONIC ACID 5 MG/100ML
5 INJECTION, SOLUTION INTRAVENOUS ONCE
Qty: 100 ML | Refills: 0 | Status: SHIPPED | OUTPATIENT
Start: 2021-04-30 | End: 2021-04-30

## 2021-04-30 RX ORDER — LOSARTAN POTASSIUM 25 MG/1
TABLET ORAL
Qty: 90 TAB | Refills: 4 | Status: SHIPPED | OUTPATIENT
Start: 2021-04-30 | End: 2022-05-03

## 2021-04-30 RX ORDER — LEVOTHYROXINE SODIUM 112 UG/1
TABLET ORAL
Qty: 90 TAB | Refills: 4 | Status: SHIPPED | OUTPATIENT
Start: 2021-04-30 | End: 2022-05-03 | Stop reason: SDUPTHER

## 2021-04-30 RX ORDER — SPIRONOLACTONE 25 MG/1
25 TABLET ORAL DAILY
COMMUNITY
End: 2021-04-30 | Stop reason: SDUPTHER

## 2021-04-30 NOTE — PROGRESS NOTES
1. Have you been to the ER, urgent care clinic since your last visit? No  Hospitalized since your last visit? No    2. Have you seen or consulted any other health care providers outside of the [address] [place] [address] since your last visit? Include any pap smears or colon screening.  No    Wt Readings from Last 3 Encounters:   04/30/21 169 lb 12.8 oz (77 kg)   09/02/20 173 lb 3.2 oz (78.6 kg)   03/05/20 181 lb 3.2 oz (82.2 kg)     Temp Readings from Last 3 Encounters:   04/30/21 98.8 °F (37.1 °C) (Oral)   09/02/20 98.5 °F (36.9 °C) (Oral)   03/05/20 96.6 °F (35.9 °C) (Oral)     BP Readings from Last 3 Encounters:   04/30/21 120/68   09/02/20 128/82   03/05/20 131/85     Pulse Readings from Last 3 Encounters:   04/30/21 80   09/02/20 70   03/05/20 67 \"Anemia,check iron studies,may need epo\" documented on renal progress notes on 2/27. Please specify. =>Anemia of CKD  =>Anemia of chronic disease  =>Iron deficiency anemia  =>Other anemia (spell out)  =>Other Explanation of clinical findings  =>Unable to Determine (no explanation of clinical findings)    The medical record reflects the following clinical findings, treatment, and risk factors:  --check iron studies,may need epo per renal  --2/26/2017 15:25:  HGB: 9.6 (L), HCT: 30.9 (L)  --2/27/2017 03:35:  HGB: 9.1 (L), HCT: 30.0 (L)  --2/28/2017 03:50:  HGB: 8.3 (L), HCT: 27.4 (L)  --2/27/2017 03:35  Iron: 43 (L)  TIBC: 339  Iron % saturation: 13  Ferritin: 111    Please clarify and document your clinical opinion in the progress notes and discharge summary including the definitive and/or presumptive diagnosis, (suspected or probable), related to the above clinical findings. Please include clinical findings supporting your diagnosis. If you DECLINE this query or would like to communicate with [facility], please utilize the \"[employer] message box\" at the TOP of the Progress Note on the right. QUESTIONS?    Sue Wesley RN [facility] [phone]

## 2021-04-30 NOTE — PATIENT INSTRUCTIONS
SPECIFIC INSTRUCTIONS BELOW       Start on spironolactone 25 mg a day     crestor 10 mg at night   Cozaar 25 mg at night    -------------------------------------------------------------------------------   synthrodi 112 mcg a day      on empty stomach with water only, no other meds or food or drinks   For next half hour     Take any kind of vitamins, calcium, iron   Pills  4 hours later    ---------------------------------------------------------------------------------------------------    will do reclast yearly infusion  -  At [address] ( may be prior auth is needed )     calcium and vit  Twice a day     --------------------------------------------------------------------------

## 2021-04-30 NOTE — PROGRESS NOTES
HISTORY OF PRESENT ILLNESS   Lidya Rubio is a 52   y.o. female. HPI   F/u visit for pan hypopituitarism  And  Osteoporosis  after  Sept 2020      She prefers to take yearly reclast infusion over taking Fosamax      Sept 2020     Compliant with synthroid   She has lot of phlegm and she is being evaluated for it         Old history :  She had broken ankle , right side   She fell at work   Had to wait until she healed up         Old history :    She got diagnosed with breast cancer, Finished chemo and radiation   She f/u with Dr. Marybeth Henley  She could nto continue on herceptin because of CHF ( sort of complete pre [facility] studies )  No hospitalizations   tolerating 112 mcg a day   She stopped the [address] treatments  She other wise feels good   Weight gain of 5  lb   Not exercising   compliant with meds   Tolerating them with no issues         Review of Systems   Constitutional: Negative. HENT: Negative. Psychiatric/Behavioral: Negative for depression and memory loss. The patient does not have insomnia. Physical Exam   Constitutional: She is oriented to person, place, and time. She appears well-developed and well-nourished. Neurological: She is alert and oriented to person, place, and time. She has normal reflexes. ASSESSMENT AND PLAN:       1. 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 She claims compliance with synthroid and her labs are slightly off . .. reaching sub-clinical hyperthyroidism   As she has this condition, will ignore and maintain the same dose   She will continue on Synthroid 125 mcg. - suggesting to [facility] . She does have suppressed TSH, but continuing on same dose   Since she got the reduced dose of 112 mcg she is doing fine, she has no palpitations , continuing on the same dose   Sept 2020 - continue on same dose synthroid 112 mcg a day         b. Hypogonadism.  The patient had no help on estrace 1 mg and prometrium 100 mg(changed as pt expressed that ocps remind her of fertility issues) in place of [place] ,   so she changed to another OCP- generic quan , she stopped it  for fear of DVT  And ofcourse never could get back because of breast cancer      c. Growth hormone deficiency. She stopped it as she is diagnosed with Breast cancer sept 2015       D. ACTH stim test- she passed  The test  indicating no adrenal insufficiency   DIZZY SPELLS- disappeared on their own       2. CHF - from hirceptin  On toprol xl   F/[facility]. Makenzie Donald [facility]       3. osteopenia ; and she is off  OCP   Continue on calcium supplementation. s/p toe fracture on left pinky toe  (Traumatic )  S/p ankle fracture on right side ( had a fall )  She had breast cancer     March 2020   t scores - -1.2 ; -1.0     z scores - 2.2  ; On spine   -1.6       I am considering I/v reclast , but patient preferred oral medications, Weekly fosamax was started. As she had side effects from it - muscle spasms, she is opting reclast         4,. Hyperlipidemia : off   on crestor 10 mg at night       5. Breast cancer , left side, lumpectomy   F/u with Dr. Lidia Warner  BRCA positive stage 1  Had  local irradiation ,  finished chemo   Being HR positive, she could not take herceptin because of CHF   No Lymphnodes    6. Pre diabetic : a1c is  6 %    ? stereoids ? Stress     7.  BRCA 2 positive - had  ENRIQUE and BSLO    8. Hirsutism : wants to resume  spironolactone         Reviewed results with patient and discussed the labs being ordered today/bnv  Patient voiced understanding of plan of care

## 2021-04-30 NOTE — LETTER
5/1/2021 Patient: Asia Hou YOB: 1970 Date of Visit: 4/30/2021 Natan Arias MD 
[facility] [address] [address] Via Fax: [phone] Dear Natan Arias MD, Thank you for referring Ms. Bam Elise to [address] [place] [address] for evaluation. My notes for this consultation are attached. If you have questions, please do not hesitate to call me. I look forward to following your patient along with you. Sincerely, Eunice Mendes MD

## 2021-06-03 ENCOUNTER — HOSPITAL ENCOUNTER (OUTPATIENT)
Dept: INFUSION THERAPY | Age: 51
Discharge: HOME | End: 2021-06-03
Payer: COMMERCIAL

## 2021-06-03 VITALS
SYSTOLIC BLOOD PRESSURE: 116 MMHG | DIASTOLIC BLOOD PRESSURE: 67 MMHG | OXYGEN SATURATION: 96 % | BODY MASS INDEX: 32.98 KG/M2 | HEART RATE: 77 BPM | TEMPERATURE: 98.4 F | HEIGHT: 60 IN | RESPIRATION RATE: 16 BRPM | WEIGHT: 168 LBS

## 2021-06-03 LAB
ALBUMIN SERPL-MCNC: 3.8 G/DL (ref 3.5–5)
ANION GAP SERPL CALC-SCNC: 4 MMOL/L (ref 5–15)
BUN SERPL-MCNC: 18 MG/DL (ref 6–20)
BUN/CREAT SERPL: 26 (ref 12–20)
CA-I BLD-MCNC: 9.3 MG/DL (ref 8.5–10.1)
CHLORIDE SERPL-SCNC: 104 MMOL/L (ref 97–108)
CO2 SERPL-SCNC: 28 MMOL/L (ref 21–32)
CREAT SERPL-MCNC: 0.7 MG/DL (ref 0.55–1.02)
GLUCOSE SERPL-MCNC: 84 MG/DL (ref 65–100)
PHOSPHATE SERPL-MCNC: 3.6 MG/DL (ref 2.6–4.7)
POTASSIUM SERPL-SCNC: ABNORMAL MMOL/L (ref 3.5–5.1)
SODIUM SERPL-SCNC: 136 MMOL/L (ref 136–145)

## 2021-06-03 PROCEDURE — 36415 COLL VENOUS BLD VENIPUNCTURE: CPT

## 2021-06-03 PROCEDURE — 74011250636 HC RX REV CODE- 250/636: Performed by: INTERNAL MEDICINE

## 2021-06-03 PROCEDURE — 80069 RENAL FUNCTION PANEL: CPT

## 2021-06-03 PROCEDURE — 96365 THER/PROPH/DIAG IV INF INIT: CPT

## 2021-06-03 RX ORDER — ZOLEDRONIC ACID 5 MG/100ML
5 INJECTION, SOLUTION INTRAVENOUS ONCE
Status: COMPLETED | OUTPATIENT
Start: 2021-06-03 | End: 2021-06-03

## 2021-06-03 RX ADMIN — ZOLEDRONIC ACID 5 MG: 5 INJECTION, SOLUTION INTRAVENOUS at 14:38

## 2021-06-29 ENCOUNTER — OFFICE VISIT (OUTPATIENT)
Dept: OBGYN CLINIC | Age: 51
End: 2021-06-29
Payer: COMMERCIAL

## 2021-06-29 VITALS
SYSTOLIC BLOOD PRESSURE: 118 MMHG | RESPIRATION RATE: 16 BRPM | HEIGHT: 60 IN | WEIGHT: 173 LBS | HEART RATE: 85 BPM | OXYGEN SATURATION: 100 % | DIASTOLIC BLOOD PRESSURE: 77 MMHG | BODY MASS INDEX: 33.96 KG/M2

## 2021-06-29 DIAGNOSIS — N95.1 VAGINAL DRYNESS, MENOPAUSAL: ICD-10-CM

## 2021-06-29 DIAGNOSIS — N76.0 VAGINITIS AND VULVOVAGINITIS: ICD-10-CM

## 2021-06-29 DIAGNOSIS — Z90.710 SCREENING FOR MALIGNANT NEOPLASM OF VAGINA AFTER TOTAL HYSTERECTOMY: ICD-10-CM

## 2021-06-29 DIAGNOSIS — Z12.72 SCREENING FOR MALIGNANT NEOPLASM OF VAGINA AFTER TOTAL HYSTERECTOMY: ICD-10-CM

## 2021-06-29 DIAGNOSIS — Z01.419 GYNECOLOGIC EXAM NORMAL: Primary | ICD-10-CM

## 2021-06-29 PROCEDURE — 99396 PREV VISIT EST AGE 40-64: CPT | Performed by: OBSTETRICS & GYNECOLOGY

## 2021-06-29 RX ORDER — FLUCONAZOLE 150 MG/1
150 TABLET ORAL DAILY
Qty: 1 TABLET | Refills: 0 | Status: SHIPPED | OUTPATIENT
Start: 2021-06-29 | End: 2021-06-30

## 2021-06-29 RX ORDER — ESTRADIOL 10 UG/1
10 INSERT VAGINAL
Qty: 90 TABLET | Refills: 2 | Status: SHIPPED | OUTPATIENT
Start: 2021-06-29 | End: 2021-08-24 | Stop reason: SDUPTHER

## 2021-06-29 NOTE — PATIENT INSTRUCTIONS
Vaginal Yeast Infection: Care Instructions  Overview     A vaginal yeast infection is the growth of too many yeast cells in the vagina. This is common in women of all ages. Itching, vaginal discharge and irritation, and other symptoms can bother you. But yeast infections don't often cause other health problems. Some medicines can increase your risk of getting a yeast infection. These include antibiotics, birth control pills, hormones, and steroids. You may also be more likely to get a yeast infection if you are pregnant, have diabetes, douche, or wear tight clothes. With treatment, most yeast infections get better in 2 to 3 days. Follow-up care is a key part of your treatment and safety. Be sure to make and go to all appointments, and call your doctor if you are having problems. It's also a good idea to know your test results and keep a list of the medicines you take. How can you care for yourself at home? · Take your medicines exactly as prescribed. Call your doctor if you think you are having a problem with your medicine. · Ask your doctor about over-the-counter (OTC) medicines for yeast infections. They may cost less than prescription medicines. If you use an OTC treatment, read and follow all instructions on the label. · Don't use tampons while using a vaginal cream or suppository. The tampons can absorb the medicine. Use pads instead. · Wear loose cotton clothing. Don't wear nylon or other fabric that holds body heat and moisture close to the skin. · Try sleeping without underwear. · Don't scratch. Relieve itching with a cold pack or a cool bath. · Don't wash your vaginal area more than once a day. Use plain water or a mild, unscented soap. Air-dry the vaginal area. · Change out of wet swimsuits after swimming. · If you are using a vaginal medicine, don't have sex until you have finished your treatment. But if you do have sex, don't depend on a condom or diaphragm for birth control.  The oil in some vaginal medicines weakens latex. · Don't douche. When should you call for help? Call your doctor now or seek immediate medical care if:    · You have unexpected vaginal bleeding.     · You have new or increased pain in your vagina or pelvis. Watch closely for changes in your health, and be sure to contact your doctor if:    · You have a fever.     · You are not getting better after 2 days.     · Your symptoms come back after you finish your medicines. Where can you learn more? Go to http://www.gray.com/  Enter V332 in the search box to learn more about \"Vaginal Yeast Infection: Care Instructions. \"  Current as of: July 17, 2020               Content Version: 12.8  © 2006-2021 [employer]. Care instructions adapted under license by [employer] (which disclaims liability or warranty for this information). If you have questions about a medical condition or this instruction, always ask your healthcare professional. [place] [address] any warranty or liability for your use of this information. Breast Self-Exam: Care Instructions  Your Care Instructions     A breast self-exam is when you check your breasts for lumps or changes. This regular exam helps you learn how your breasts normally look and feel. Most breast problems or changes are not because of cancer. Breast self-exam is not a substitute for a mammogram. Having regular breast exams by your doctor and regular mammograms improve your chances of finding any problems with your breasts. Some women set a time each month to do a step-by-step breast self-exam. Other women like a less formal system. They might look at their breasts as they brush their teeth, or feel their breasts once in a while in the shower. If you notice a change in your breast, tell your doctor. Follow-up care is a key part of your treatment and safety.  Be sure to make and go to all appointments, and call your doctor if you are having problems. It's also a good idea to know your test results and keep a list of the medicines you take. How do you do a breast self-exam?  · The best time to examine your breasts is usually one week after your menstrual period begins. Your breasts should not be tender then. If you do not have periods, you might do your exam on a day of the month that is easy to remember. · To examine your breasts:  ? Remove all your clothes above the waist and lie down. When you are lying down, your breast tissue spreads evenly over your chest wall, which makes it easier to feel all your breast tissue. ? Use the padsnot the fingertipsof the 3 middle fingers of your left hand to check your right breast. Move your fingers slowly in small coin-sized circles that overlap. ? Use three levels of pressure to feel of all your breast tissue. Use light pressure to feel the tissue close to the skin surface. Use medium pressure to feel a little deeper. Use firm pressure to feel your tissue close to your breastbone and ribs. Use each pressure level to feel your breast tissue before moving on to the next spot. ? Check your entire breast, moving up and down as if following a strip from the collarbone to the bra line, and from the armpit to the ribs. Repeat until you have covered the entire breast.  ? Repeat this procedure for your left breast, using the pads of the 3 middle fingers of your right hand. · To examine your breasts while in the shower:  ? Place one arm over your head and lightly soap your breast on that side. ? Using the pads of your fingers, gently move your hand over your breast (in the strip pattern described above), feeling carefully for any lumps or changes. ? Repeat for the other breast.  · Have your doctor inspect anything you notice to see if you need further testing. Where can you learn more?   Go to http://www.gray.com/  Enter P148 in the search box to learn more about \"Breast Self-Exam: Care Instructions. \"  Current as of: December 17, 2020               Content Version: 12.8  © 2006-2021 [employer]. Care instructions adapted under license by [employer] (which disclaims liability or warranty for this information). If you have questions about a medical condition or this instruction, always ask your healthcare professional. [address] any warranty or liability for your use of this information.

## 2021-07-01 LAB
CYTOLOGIST CVX/VAG CYTO: NORMAL
CYTOLOGY CVX/VAG DOC CYTO: NORMAL
CYTOLOGY CVX/VAG DOC THIN PREP: NORMAL
DX ICD CODE: NORMAL
LABCORP, 190119: NORMAL
Lab: NORMAL
Lab: NORMAL
OTHER STN SPEC: NORMAL
STAT OF ADQ CVX/VAG CYTO-IMP: NORMAL

## 2021-07-01 NOTE — PROGRESS NOTES
Ora Hernandez is a 52 y.o. female, , Patient's last menstrual period was 2011., who presents today for the following:  Chief Complaint   Patient presents with    Annual Exam        Allergies   Allergen Reactions    Imitrex [Sumatriptan Succinate] Swelling     tongue       Current Outpatient Medications   Medication Sig    estradioL (VAGIFEM) 10 mcg tab vaginal tablet Insert 1 Tablet into vagina every Tuesday and Friday. Indications: vaginal inflammation due to loss of hormone stimulation    fluconazole (DIFLUCAN) 150 mg tablet Take 1 Tablet by mouth daily for 1 day. FDA advises cautious prescribing of oral fluconazole in pregnancy.  spironolactone (ALDACTONE) 25 mg tablet One pill a day    levothyroxine (SYNTHROID) 112 mcg tablet One pill a day    rosuvastatin (CRESTOR) 10 mg tablet Take 1 Tab by mouth nightly. Stop pravachol    losartan (COZAAR) 25 mg tablet Take 1 tablet every night    carvediloL (COREG) 6.25 mg tablet Take  by mouth two (2) times daily (with meals).  cholecalciferol (VITAMIN D3) 1,000 unit tablet Take  by mouth daily. (Patient not taking: Reported on 2021)    multivitamin (ONE A DAY) tablet Take 1 Tab by mouth daily. (Patient not taking: Reported on 2021)     No current facility-administered medications for this visit.        Past Medical History:   Diagnosis Date    Breast cancer ([address]     left side     Broken toe     left 5th toe    Hyperlipidemia     Hypertension     Hypopituitarism ([address]     Hypothyroidism        Past Surgical History:   Procedure Laterality Date    HX BREAST LUMPECTOMY      HX  SECTION      HX HYSTERECTOMY      HX ORTHOPAEDIC Right     ankle       Family History   Problem Relation Age of Onset    Cancer Father     Diabetes Maternal Grandmother     Diabetes Paternal Grandmother        Social History     Socioeconomic History    Marital status:      Spouse name: Not on file    Number of children: Not on file    Years of education: Not on file    Highest education level: Not on file   Occupational History    Not on file   Tobacco Use    Smoking status: Never Smoker    Smokeless tobacco: Never Used   Substance and Sexual Activity    Alcohol use: Yes     Comment: beer, wine and liquor occassionally    Drug use: No    Sexual activity: Yes     Partners: Male     Birth control/protection: Surgical   Other Topics Concern    Not on file   Social History Narrative    Not on file     Social Determinants of Health     Financial Resource Strain:     Difficulty of Paying Living Expenses:    Food Insecurity:     Worried About Running Out of Food in the Last Year:     [address] in the Last Year:    Transportation Needs:     Lack of Transportation (Medical):  Lack of Transportation (Non-Medical):    Physical Activity:     Days of Exercise per Week:     Minutes of Exercise per Session:    Stress:     Feeling of Stress :    Social Connections:     Frequency of Communication with Friends and Family:     Frequency of Social Gatherings with Friends and Family:     Attends [employer] Services:     Active Member of Clubs or Organizations:     Attends Club or Organization Meetings:     Marital Status:    Intimate Partner Violence:     Fear of Current or Ex-Partner:     Emotionally Abused:     Physically Abused:     Sexually Abused:          HPI  Annual exam  C/o vaginal dryness    Review of Systems   Constitutional: Negative. Respiratory: Negative. Cardiovascular: Negative. Gastrointestinal: Negative. Genitourinary: Negative. Musculoskeletal: Negative. Skin: Negative. Neurological: Negative. Endo/Heme/Allergies: Negative. Psychiatric/Behavioral: Negative. All other systems reviewed and are negative.          /77 (BP 1 Location: Right arm, BP Patient Position: Sitting)   Pulse 85   Resp 16   Ht 5' (1.524 m)   Wt 173 lb (78.5 kg)   LMP 01/06/2011   SpO2 100%   BMI 33.79 kg/m²    OBGyn Exam   Constitutional:      General Appearance: healthy-appearing, well-nourished, and well-developed; Level of Distress: NAD. Ambulation: ambulating normally. Psychiatric:   Insight: good judgement. Mental Status: normal mood and affect and active and alert. Orientation: to time, place, and person. Memory: recent memory normal and remote memory normal.     Head: Head: normocephalic and atraumatic. Neck:   Neck: supple, FROM, trachea midline, and no masses. Lymph Nodes: no cervical LAD, supraclavicular LAD, axillary LAD, or inguinal LAD. Thyroid: no enlargement or nodules and non-tender. Lungs:   Respiratory effort: no dyspnea. Cardiovascular:   .   Pulses including femoral / pedal: normal throughout. Breast: Breast: no masses or abnormal secretions and normal appearance. Abdomen:   : no tenderness, guarding, masses, rebound tenderness, or CVA tenderness and non-distended. Female :   External genitalia: no lesions or rash and normal.   Vagina: moist mucosa;  discharge. Adnexae: no adnexal mass or tenderness and size WNL. Bladder and Urethra: normal bladder and urethra (except where noted). Skin:   Inspection and palpation: no rash, lesions, ulcer, induration, nodules, jaundice, or abnormal nevi and good turgor. Nails: normal.     Back: Thoracolumbar Appearance: normal curvature. 1. Gynecologic exam normal    - PAP IG, RFX APTIMA HPV ASCUS ([id number])    2. Screening for malignant neoplasm of vagina after total hysterectomy      3. Vaginal dryness, menopausal    - estradioL (VAGIFEM) 10 mcg tab vaginal tablet; Insert 1 Tablet into vagina every Tuesday and Friday. Indications: vaginal inflammation due to loss of hormone stimulation  Dispense: 90 Tablet; Refill: 2    4. Vaginitis and vulvovaginitis    - fluconazole (DIFLUCAN) 150 mg tablet; Take 1 Tablet by mouth daily for 1 day. FDA advises cautious prescribing of oral fluconazole in pregnancy. Dispense: 1 Tablet;  Refill: 0        Follow-up and Dispositions    · Return in about 1 year (around 6/29/2022) for annual.

## 2021-10-19 LAB
ALBUMIN SERPL-MCNC: 4.7 G/DL (ref 3.8–4.8)
ALBUMIN/GLOB SERPL: 1.3 {RATIO} (ref 1.2–2.2)
ALP SERPL-CCNC: 63 IU/L (ref 44–121)
ALT SERPL-CCNC: 26 IU/L (ref 0–32)
AST SERPL-CCNC: 22 IU/L (ref 0–40)
BILIRUB SERPL-MCNC: 0.5 MG/DL (ref 0–1.2)
BUN SERPL-MCNC: 11 MG/DL (ref 6–24)
BUN/CREAT SERPL: 13 (ref 9–23)
CALCIUM SERPL-MCNC: 9.4 MG/DL (ref 8.7–10.2)
CHLORIDE SERPL-SCNC: 101 MMOL/L (ref 96–106)
CO2 SERPL-SCNC: 24 MMOL/L (ref 20–29)
CREAT SERPL-MCNC: 0.85 MG/DL (ref 0.57–1)
DHEA-S SERPL-MCNC: 45.9 UG/DL (ref 41.2–243.7)
ESTRADIOL SERPL-MCNC: <5 PG/ML
GLOBULIN SER CALC-MCNC: 3.5 G/DL (ref 1.5–4.5)
GLUCOSE SERPL-MCNC: 90 MG/DL (ref 65–99)
POTASSIUM SERPL-SCNC: 3.9 MMOL/L (ref 3.5–5.2)
PROT SERPL-MCNC: 8.2 G/DL (ref 6–8.5)
SHBG SERPL-SCNC: 46 NMOL/L (ref 24.6–122)
SODIUM SERPL-SCNC: 139 MMOL/L (ref 134–144)
T4 FREE SERPL-MCNC: 1.65 NG/DL (ref 0.82–1.77)
TESTOST SERPL-MCNC: 10.4 NG/DL

## 2021-11-01 ENCOUNTER — OFFICE VISIT (OUTPATIENT)
Dept: ENDOCRINOLOGY | Age: 51
End: 2021-11-01
Payer: COMMERCIAL

## 2021-11-01 VITALS
WEIGHT: 174.6 LBS | HEART RATE: 84 BPM | BODY MASS INDEX: 34.28 KG/M2 | OXYGEN SATURATION: 99 % | HEIGHT: 60 IN | SYSTOLIC BLOOD PRESSURE: 119 MMHG | TEMPERATURE: 96.5 F | DIASTOLIC BLOOD PRESSURE: 79 MMHG | RESPIRATION RATE: 18 BRPM

## 2021-11-01 DIAGNOSIS — E03.9 HYPOTHYROIDISM, UNSPECIFIED TYPE: ICD-10-CM

## 2021-11-01 DIAGNOSIS — I10 ESSENTIAL HYPERTENSION: ICD-10-CM

## 2021-11-01 DIAGNOSIS — E88.81 DYSMETABOLIC SYNDROME: ICD-10-CM

## 2021-11-01 DIAGNOSIS — E78.5 HYPERLIPIDEMIA, UNSPECIFIED HYPERLIPIDEMIA TYPE: ICD-10-CM

## 2021-11-01 DIAGNOSIS — E23.6 EMPTY SELLA SYNDROME (HCC): Primary | ICD-10-CM

## 2021-11-01 PROCEDURE — 99214 OFFICE O/P EST MOD 30 MIN: CPT | Performed by: INTERNAL MEDICINE

## 2021-11-01 RX ORDER — FUROSEMIDE 40 MG/1
TABLET ORAL
COMMUNITY

## 2021-11-01 NOTE — PATIENT INSTRUCTIONS
SPECIFIC INSTRUCTIONS BELOW       crestor 10 mg at night     Cozaar 25 mg at night    -------------------------------------------------------------------------------   synthrodi 112 mcg a day      on empty stomach with water only, no other meds or food or drinks   For next half hour   Take any kind of vitamins, calcium, iron   Pills  4 hours later    ---------------------------------------------------------------------------------------------------      calcium and vit  Twice a day             -------------PAY ATTENTION TO THESE GENERAL INSTRUCTIONS -----------------      - The medications prescribed at this visit will not be available at pharmacy until 6 pm       - YOUR MED LIST IS NOT UP TO DATE AS SOME CHANGES ARE BEING MADE AFTER THE VISIT - FOLLOW SPECIFIC INSTRUCTIONS  ABOVE     -ANY tests other than blood work, which you opt to do  outside the  [facility] imaging facilities, you are responsible for prior authorizations if  required    - [address] UP TO DATE ON YOUR AVS- PLEASE IGNORE     Results     *Normal results will not be notified by a phone call starting January 1 2021   *If you have an upcoming visit, the results will be discussed at the visit   *Please sign up for MY CHART if you want access to your lab and test results  *Abnormal results which require immediate attention will be notified by phone call   *Abnormal results which do not require immediate assistance will be notified in 1-2 weeks       Refills    -    have your pharmacy send us a refill request . Refills are done max for one year and a visit is a must before refills are extended    Follow up appointments -  highly encourage you to make it when you are checking out. We can accommodate you into the schedule based on your clinical situation, but not for extending refills beyond a year. Labs are important to give refills and is important to get labs before the visit     Phone calls  -  Allow  24 hrs.  for non-urgent calls to be returned  Prior authorization - It may take 2-4 weeks to process  Forms  -  FMLA, DMV etc., will take up to 2 weeks to process  Cancellations - please notify the office 2 days in advance   Samples  - will only be dispensed at visits       If not showing for the appointments and cancelling appointments within 24 hours are kept track of and three  of such situations in  two consecutive years will likely be considered for termination from the practice    -------------------------------------------------------------------------------------------------------------------

## 2021-11-01 NOTE — LETTER
11/1/2021    Patient: Favian Morocho   YOB: 1970   Date of Visit: 11/1/2021     Adolfo Leal MD [address] [address] [address]  Via Fax: [phone]    Dear Adolfo Leal MD,      Thank you for referring Ms. Aixa Jordan to [address] [place] [address] for evaluation. My notes for this consultation are attached. If you have questions, please do not hesitate to call me. I look forward to following your patient along with you.       Sincerely,    Jean Hurd MD

## 2021-11-01 NOTE — PROGRESS NOTES
HISTORY OF PRESENT ILLNESS   Cate Ramey is a 52   y.o. female. HPI   F/u visit for pan hypopituitarism  And  Osteoporosis  after  April 2021     Tolerated the reclast infusion in June 2021 April 2021    She prefers to take yearly reclast infusion over taking Fosamax      Sept 2020     Compliant with synthroid   She has lot of phlegm and she is being evaluated for it         Old history :  She had broken ankle , right side   She fell at work   Had to wait until she healed up         Old history :    She got diagnosed with breast cancer, Finished chemo and radiation   She f/u with Dr. Ashley Herrera  She could nto continue on herceptin because of CHF ( sort of complete pre [facility] studies )  No hospitalizations   tolerating 112 mcg a day   She stopped the [facility] treatments  She other wise feels good   Weight gain of 5  lb   Not exercising   compliant with meds   Tolerating them with no issues         Review of Systems   Constitutional: Negative. HENT: Negative. Psychiatric/Behavioral: Negative for depression and memory loss. The patient does not have insomnia. Physical Exam   Constitutional: She is oriented to person, place, and time. She appears well-developed and well-nourished. Neurological: She is alert and oriented to person, place, and time. She has normal reflexes. Lab Results   Component Value Date/Time    GFR est non-AA 81 10/15/2021 10:52 AM    GFR est AA 94 10/15/2021 10:52 AM    Creatinine 0.85 10/15/2021 10:52 AM    BUN 11 10/15/2021 10:52 AM    Sodium 139 10/15/2021 10:52 AM    Potassium 3.9 10/15/2021 10:52 AM    Chloride 101 10/15/2021 10:52 AM    CO2 24 10/15/2021 10:52 AM    Phosphorus 3.6 06/03/2021 01:50 PM     Lab Results   Component Value Date/Time    TSH 0.113 (L) 02/18/2020 08:58 AM    T4, Free 1.65 10/15/2021 10:52 AM          ASSESSMENT AND PLAN:       1. 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  She claims compliance with synthroid and her labs are slightly off . .. reaching sub-clinical hyperthyroidism   As she has this condition, will ignore and maintain the same dose   She will continue on Synthroid 125 mcg. - suggesting to [facility] [place] by [facility] . She does have suppressed TSH, but continuing on same dose   Since she got the reduced dose of 112 mcg she is doing fine, she has no palpitations , continuing on the same dose   Nov 2021 - continue on same dose synthroid 112 mcg a day         b. Hypogonadism. The patient had no help on estrace 1 mg and prometrium 100 mg(changed as pt expressed that ocps remind her of fertility issues) in place of Ardine Ashli ,   so she changed to another OCP- generic quan , she stopped it  for fear of DVT  And ofcourse never could get back because of breast cancer      c. Growth hormone deficiency. She stopped it as she is diagnosed with Breast cancer sept 2015       D. ACTH stim test- she passed  The test  indicating no adrenal insufficiency   DIZZY SPELLS- disappeared on their own       2. CHF - from hirceptin  On toprol xl   F/u with Dr. May Officer at [address] [place] [address]       3. osteopenia ; and she is off  OCP   Continue on calcium supplementation. s/p toe fracture on left pinky toe  (Traumatic )  S/p ankle fracture on right side ( had a fall )  She had breast cancer     March 2020   t scores - -1.2 ; -1.0     z scores - 2.2  ; On spine   -1.6     I am considering I/v reclast , but patient preferred oral medications, Weekly fosamax was started. As she had side effects from it - muscle spasms, she is opting reclast   First infusion in June 2021     Takes estradiol vagifem twice a  Week     4,. Hyperlipidemia : off , on crestor 10 mg at night       5. Breast cancer , left side, lumpectomy   F/u with Dr. Arias Aldridge  BRCA positive stage 1  Had  local irradiation ,  finished chemo   Being HR positive, she could not take herceptin because of CHF   No Lymphnodes        6.   Hirsutism : wanted to resume  spironolactone but she felt that  Small amount of vagifem has helped her         Reviewed results with patient and discussed the labs being ordered today/bnv  Patient voiced understanding of plan of care

## 2022-04-12 LAB
ALBUMIN SERPL-MCNC: 4.7 G/DL (ref 3.8–4.8)
ALBUMIN/GLOB SERPL: 1.3 {RATIO} (ref 1.2–2.2)
ALP SERPL-CCNC: 66 IU/L (ref 44–121)
ALT SERPL-CCNC: 16 IU/L (ref 0–32)
AST SERPL-CCNC: 17 IU/L (ref 0–40)
BILIRUB SERPL-MCNC: 0.5 MG/DL (ref 0–1.2)
BUN SERPL-MCNC: 13 MG/DL (ref 6–24)
BUN/CREAT SERPL: 17 (ref 9–23)
CALCIUM SERPL-MCNC: 10.1 MG/DL (ref 8.7–10.2)
CHLORIDE SERPL-SCNC: 100 MMOL/L (ref 96–106)
CHOLEST SERPL-MCNC: 232 MG/DL (ref 100–199)
CO2 SERPL-SCNC: 21 MMOL/L (ref 20–29)
CREAT SERPL-MCNC: 0.78 MG/DL (ref 0.57–1)
EGFR: 93 ML/MIN/1.73
EST. AVERAGE GLUCOSE BLD GHB EST-MCNC: 108 MG/DL
GLOBULIN SER CALC-MCNC: 3.6 G/DL (ref 1.5–4.5)
GLUCOSE SERPL-MCNC: 98 MG/DL (ref 65–99)
HBA1C MFR BLD: 5.4 % (ref 4.8–5.6)
HDLC SERPL-MCNC: 77 MG/DL
IMP & REVIEW OF LAB RESULTS: NORMAL
LDLC SERPL CALC-MCNC: 142 MG/DL (ref 0–99)
POTASSIUM SERPL-SCNC: 4.4 MMOL/L (ref 3.5–5.2)
PROT SERPL-MCNC: 8.3 G/DL (ref 6–8.5)
SODIUM SERPL-SCNC: 139 MMOL/L (ref 134–144)
T4 FREE SERPL-MCNC: 1.93 NG/DL (ref 0.82–1.77)
TRIGL SERPL-MCNC: 74 MG/DL (ref 0–149)
VLDLC SERPL CALC-MCNC: 13 MG/DL (ref 5–40)

## 2022-05-03 ENCOUNTER — OFFICE VISIT (OUTPATIENT)
Dept: ENDOCRINOLOGY | Age: 52
End: 2022-05-03
Payer: COMMERCIAL

## 2022-05-03 VITALS
HEART RATE: 96 BPM | WEIGHT: 178 LBS | OXYGEN SATURATION: 100 % | DIASTOLIC BLOOD PRESSURE: 76 MMHG | HEIGHT: 60 IN | BODY MASS INDEX: 34.95 KG/M2 | SYSTOLIC BLOOD PRESSURE: 121 MMHG | TEMPERATURE: 97.5 F

## 2022-05-03 DIAGNOSIS — I10 ESSENTIAL HYPERTENSION: ICD-10-CM

## 2022-05-03 DIAGNOSIS — R73.03 PREDIABETES: ICD-10-CM

## 2022-05-03 DIAGNOSIS — L68.0 HIRSUTISM: ICD-10-CM

## 2022-05-03 DIAGNOSIS — M81.0 SENILE OSTEOPOROSIS: Primary | ICD-10-CM

## 2022-05-03 DIAGNOSIS — E23.6 EMPTY SELLA SYNDROME (HCC): ICD-10-CM

## 2022-05-03 DIAGNOSIS — N95.1 VAGINAL DRYNESS, MENOPAUSAL: ICD-10-CM

## 2022-05-03 DIAGNOSIS — E78.5 HYPERLIPIDEMIA, UNSPECIFIED HYPERLIPIDEMIA TYPE: ICD-10-CM

## 2022-05-03 PROCEDURE — 99214 OFFICE O/P EST MOD 30 MIN: CPT | Performed by: INTERNAL MEDICINE

## 2022-05-03 RX ORDER — ZOLEDRONIC ACID 5 MG/100ML
5 INJECTION, SOLUTION INTRAVENOUS ONCE
Qty: 100 ML | Refills: 0 | Status: SHIPPED | OUTPATIENT
Start: 2022-05-03 | End: 2022-05-03

## 2022-05-03 RX ORDER — LOSARTAN POTASSIUM 50 MG/1
TABLET ORAL
Qty: 90 TABLET | Refills: 3
Start: 2022-05-03

## 2022-05-03 RX ORDER — ESTRADIOL 10 UG/1
10 INSERT VAGINAL
Qty: 24 TABLET | Refills: 2
Start: 2022-05-03 | End: 2022-06-06

## 2022-05-03 RX ORDER — ROSUVASTATIN CALCIUM 10 MG/1
10 TABLET, COATED ORAL
Qty: 90 TABLET | Refills: 4
Start: 2022-05-03 | End: 2022-05-09 | Stop reason: SDUPTHER

## 2022-05-03 RX ORDER — LEVOTHYROXINE SODIUM 112 UG/1
TABLET ORAL
Qty: 90 TABLET | Refills: 4 | Status: SHIPPED | OUTPATIENT
Start: 2022-05-03 | End: 2022-05-09 | Stop reason: SDUPTHER

## 2022-05-03 NOTE — PROGRESS NOTES
HISTORY OF PRESENT ILLNESS   Chacha Pitt is a 54   y.o. female. HPI   F/u visit for pan hypopituitarism  And  Osteoporosis  after  Nov 2021       She is explaining how her day goes   She says thyroid med is always taken with all meds   She misses the chl medication -       Nov 2021     Tolerated the reclast infusion in June 2021       Old history :    She got diagnosed with breast cancer, Finished chemo and radiation   She f/u with Dr. Regina Russo  She could nto continue on herceptin because of CHF ( sort of complete pre [facility] studies )  No hospitalizations   tolerating 112 mcg a day   She stopped the [facility] treatments  She other wise feels good   Weight gain of 5  lb         Review of Systems   Constitutional: Negative. HENT: c/o post nasal drip    Psychiatric/Behavioral: Negative for depression and memory loss. The patient does not have insomnia. Physical Exam   Constitutional: She is oriented to person, place, and time. She appears well-developed and well-nourished. Neurological: She is alert and oriented to person, place, and time. She has normal reflexes. Lab Results   Component Value Date/Time    GFR est non-AA 81 10/15/2021 10:52 AM    GFR est AA 94 10/15/2021 10:52 AM    Creatinine 0.78 04/11/2022 09:11 AM    BUN 13 04/11/2022 09:11 AM    Sodium 139 04/11/2022 09:11 AM    Potassium 4.4 04/11/2022 09:11 AM    Chloride 100 04/11/2022 09:11 AM    CO2 21 04/11/2022 09:11 AM    Phosphorus 3.6 06/03/2021 01:50 PM     Lab Results   Component Value Date/Time    TSH 0.113 (L) 02/18/2020 08:58 AM    T4, Free 1.93 (H) 04/11/2022 09:11 AM          ASSESSMENT AND PLAN:       1. 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 May 2022  - continue on same dose synthroid 112 mcg a day         b. Hypogonadism.   pt expressed that ocps remind her of fertility issues  OCP is stopped for fear of DVT  And ofcourse never could get back because of breast cancer      c. Growth hormone deficiency. She stopped it as she is diagnosed with Breast cancer sept 2015       D. ACTH stim test- she passed  The test  indicating no adrenal insufficiency   DIZZY SPELLS- disappeared on their own       2. CHF - from hirceptin  On coreg and lasix  And cozaar 50   changed to [facility]        3. osteopenia :   Continue on calcium supplementation. s/p toe fracture on left pinky toe  (Traumatic )  S/p ankle fracture on right side ( had a fall )  She had breast cancer     March 2020   t scores - -1.2 ; -1.0     z scores - 2.2  ; On spine   -1.6     I am considering I/v reclast , but patient preferred oral medications, Weekly fosamax was started. As she had side effects from it - muscle spasms, she is opting reclast     Due for dexa elaine march 2022   First infusion in June 2021 , due  In  June 2022 - second infusion     Takes estradiol vagifem twice a  Week       4,. Hyperlipidemia : off , not compliant with  crestor 10 mg at night       5. Breast cancer , left side, lumpectomy   F/u with Dr. Magalys Foster  BRCA positive stage 1  Had  local irradiation ,  finished chemo   Being HR positive, she could not take herceptin because of CHF   No Lymphnodes        6.   Hirsutism : wanted to resume  spironolactone but she felt that  Small amount of vagifem has helped her         Reviewed results with patient and discussed the labs being ordered today/bnv  Patient voiced understanding of plan of care

## 2022-05-03 NOTE — LETTER
5/3/2022    Patient: Deepthi Madrigal   YOB: 1970   Date of Visit: 5/3/2022     Remington Brewster MD  [facility] [address]  [address] [address]  Via Fax: [phone]    Dear Remington Brewster MD,      Thank you for referring Ms. Alla Arteaga to [address] [place] [address] for evaluation. My notes for this consultation are attached. If you have questions, please do not hesitate to call me. I look forward to following your patient along with you.       Sincerely,    Jose Johnson MD

## 2022-05-03 NOTE — PATIENT INSTRUCTIONS
SPECIFIC INSTRUCTIONS BELOW      synthrodi 112 mcg a day      on empty stomach with water only, no other meds or food or drinks   For next half hour   Take any kind of vitamins, calcium, iron   Pills  4 hours later    ---------------------------------------------------------------------------------------------------      calcium and vit  Twice a day       Reclast  Infusion  -   After  [place]   3  2022         -------------PAY ATTENTION TO THESE GENERAL INSTRUCTIONS -----------------      - The medications prescribed at this visit will not be available at pharmacy until 6 pm       - YOUR MED LIST IS NOT UP TO DATE AS SOME CHANGES ARE BEING MADE AFTER THE VISIT - FOLLOW SPECIFIC INSTRUCTIONS  ABOVE     -ANY tests other than blood work, which you opt to do  outside the  [facility] imaging facilities, you are responsible for prior authorizations if  required    - [phone] [facility]- PLEASE IGNORE     Results     *Normal results will not be notified by a phone call starting January 1 2021   *If you have an upcoming visit, the results will be discussed at the visit   *Please sign up for MY CHART if you want access to your lab and test results  *Abnormal results which require immediate attention will be notified by phone call   *Abnormal results which do not require immediate assistance will be notified in 1-2 weeks       Refills    -    have your pharmacy send us a refill request . Refills are done max for one year and a visit is a must before refills are extended    Follow up appointments -  highly encourage you to make it when you are checking out. We can accommodate you into the schedule based on your clinical situation, but not for extending refills beyond a year. Labs are important to give refills and is important to get labs before the visit     Phone calls  -  Allow  24 hrs.  for non-urgent calls to be returned  Prior authorization - It may take 2-4 weeks to process  Forms -  FMLA, DMV etc., will take up to 2 weeks to process  Cancellations - please notify the office 2 days in advance   Samples  - will only be dispensed at visits       If not showing for the appointments and cancelling appointments within 24 hours are kept track of and three  of such situations in  two consecutive years will likely be considered for termination from the practice    -------------------------------------------------------------------------------------------------------------------

## 2022-05-09 DIAGNOSIS — E23.6 EMPTY SELLA SYNDROME (HCC): ICD-10-CM

## 2022-05-09 DIAGNOSIS — E03.9 HYPOTHYROIDISM, UNSPECIFIED TYPE: Primary | ICD-10-CM

## 2022-05-09 DIAGNOSIS — I10 ESSENTIAL HYPERTENSION: ICD-10-CM

## 2022-05-09 DIAGNOSIS — E78.5 HYPERLIPIDEMIA, UNSPECIFIED HYPERLIPIDEMIA TYPE: ICD-10-CM

## 2022-05-09 DIAGNOSIS — L68.0 HIRSUTISM: ICD-10-CM

## 2022-05-09 RX ORDER — LEVOTHYROXINE SODIUM 112 UG/1
TABLET ORAL
Qty: 90 TABLET | Refills: 3 | Status: SHIPPED | OUTPATIENT
Start: 2022-05-09 | End: 2022-05-19 | Stop reason: SDUPTHER

## 2022-05-09 RX ORDER — ROSUVASTATIN CALCIUM 10 MG/1
10 TABLET, COATED ORAL
Qty: 90 TABLET | Refills: 3 | Status: SHIPPED | OUTPATIENT
Start: 2022-05-09 | End: 2022-05-10

## 2022-05-10 DIAGNOSIS — E78.5 HYPERLIPIDEMIA, UNSPECIFIED HYPERLIPIDEMIA TYPE: ICD-10-CM

## 2022-05-10 DIAGNOSIS — E23.6 EMPTY SELLA SYNDROME (HCC): ICD-10-CM

## 2022-05-10 DIAGNOSIS — I10 ESSENTIAL HYPERTENSION: ICD-10-CM

## 2022-05-10 DIAGNOSIS — L68.0 HIRSUTISM: ICD-10-CM

## 2022-05-10 RX ORDER — ROSUVASTATIN CALCIUM 10 MG/1
TABLET, COATED ORAL
Qty: 90 TABLET | Refills: 4 | Status: SHIPPED | OUTPATIENT
Start: 2022-05-10 | End: 2022-05-19 | Stop reason: SDUPTHER

## 2022-05-19 DIAGNOSIS — E03.9 HYPOTHYROIDISM, UNSPECIFIED TYPE: ICD-10-CM

## 2022-05-19 DIAGNOSIS — L68.0 HIRSUTISM: ICD-10-CM

## 2022-05-19 DIAGNOSIS — E23.6 EMPTY SELLA SYNDROME (HCC): ICD-10-CM

## 2022-05-19 DIAGNOSIS — E78.5 HYPERLIPIDEMIA, UNSPECIFIED HYPERLIPIDEMIA TYPE: ICD-10-CM

## 2022-05-19 DIAGNOSIS — I10 ESSENTIAL HYPERTENSION: ICD-10-CM

## 2022-05-19 RX ORDER — LEVOTHYROXINE SODIUM 112 UG/1
TABLET ORAL
Qty: 90 TABLET | Refills: 3 | Status: SHIPPED | OUTPATIENT
Start: 2022-05-19

## 2022-05-19 RX ORDER — ROSUVASTATIN CALCIUM 10 MG/1
TABLET, COATED ORAL
Qty: 90 TABLET | Refills: 3 | Status: SHIPPED | OUTPATIENT
Start: 2022-05-19

## 2022-05-19 NOTE — TELEPHONE ENCOUNTER
Fax received from [facility] requesting a 90 day prescription for Rosuvastatin and Levothyroxine. Rx's pended.

## 2022-05-22 LAB
BUN SERPL-MCNC: 14 MG/DL (ref 6–24)
BUN/CREAT SERPL: 18 (ref 9–23)
CALCIUM SERPL-MCNC: 9.7 MG/DL (ref 8.7–10.2)
CHLORIDE SERPL-SCNC: 99 MMOL/L (ref 96–106)
CO2 SERPL-SCNC: 25 MMOL/L (ref 20–29)
CREAT SERPL-MCNC: 0.8 MG/DL (ref 0.57–1)
EGFR: 90 ML/MIN/1.73
GLUCOSE SERPL-MCNC: 89 MG/DL (ref 65–99)
POTASSIUM SERPL-SCNC: 3.8 MMOL/L (ref 3.5–5.2)
SODIUM SERPL-SCNC: 141 MMOL/L (ref 134–144)

## 2022-06-05 DIAGNOSIS — N95.1 VAGINAL DRYNESS, MENOPAUSAL: ICD-10-CM

## 2022-06-06 RX ORDER — ESTRADIOL 10 UG/1
TABLET VAGINAL
Qty: 24 TABLET | Refills: 2 | Status: SHIPPED | OUTPATIENT
Start: 2022-06-06

## 2022-06-08 ENCOUNTER — HOSPITAL ENCOUNTER (OUTPATIENT)
Dept: INFUSION THERAPY | Age: 52
Discharge: HOME | End: 2022-06-08
Payer: COMMERCIAL

## 2022-06-08 VITALS
DIASTOLIC BLOOD PRESSURE: 77 MMHG | RESPIRATION RATE: 18 BRPM | WEIGHT: 177.91 LBS | HEIGHT: 60 IN | BODY MASS INDEX: 34.93 KG/M2 | OXYGEN SATURATION: 100 % | HEART RATE: 73 BPM | TEMPERATURE: 98.4 F | SYSTOLIC BLOOD PRESSURE: 120 MMHG

## 2022-06-08 PROCEDURE — 74011250636 HC RX REV CODE- 250/636: Performed by: INTERNAL MEDICINE

## 2022-06-08 PROCEDURE — 96374 THER/PROPH/DIAG INJ IV PUSH: CPT

## 2022-06-08 RX ORDER — ZOLEDRONIC ACID 5 MG/100ML
5 INJECTION, SOLUTION INTRAVENOUS
Status: COMPLETED | OUTPATIENT
Start: 2022-06-08 | End: 2022-06-08

## 2022-06-08 RX ADMIN — ZOLEDRONIC ACID 5 MG: 5 INJECTION, SOLUTION INTRAVENOUS at 11:41

## 2022-06-15 NOTE — PROGRESS NOTES
Chief Complaint   Patient presents with    Thyroid Problem     1. Have you been to the ER, urgent care clinic since your last visit? Hospitalized since your last visit? No    2. Have you seen or consulted any other health care providers outside of the [address] [place] [address] since your last visit? Include any pap smears or colon screening.  No       Wt Readings from Last 3 Encounters:   01/05/18 181 lb (82.1 kg)   01/13/17 176 lb (79.8 kg)   06/30/16 165 lb (74.8 kg)     Temp Readings from Last 3 Encounters:   01/05/18 97.8 °F (36.6 °C) (Oral)   01/13/17 98.6 °F (37 °C) (Oral)   06/30/16 97.4 °F (36.3 °C) (Oral)     BP Readings from Last 3 Encounters:   01/05/18 149/85   01/13/17 127/68   06/30/16 109/78     Pulse Readings from Last 3 Encounters:   01/05/18 70   01/13/17 74   06/30/16 100 yes

## 2022-07-20 ENCOUNTER — OFFICE VISIT (OUTPATIENT)
Dept: OBGYN CLINIC | Age: 52
End: 2022-07-20
Payer: COMMERCIAL

## 2022-07-20 VITALS
HEART RATE: 84 BPM | WEIGHT: 181 LBS | RESPIRATION RATE: 16 BRPM | SYSTOLIC BLOOD PRESSURE: 144 MMHG | BODY MASS INDEX: 35.53 KG/M2 | HEIGHT: 60 IN | OXYGEN SATURATION: 98 % | DIASTOLIC BLOOD PRESSURE: 90 MMHG

## 2022-07-20 DIAGNOSIS — Z90.710 SCREENING FOR MALIGNANT NEOPLASM OF VAGINA AFTER TOTAL HYSTERECTOMY: ICD-10-CM

## 2022-07-20 DIAGNOSIS — Z12.72 SCREENING FOR MALIGNANT NEOPLASM OF VAGINA AFTER TOTAL HYSTERECTOMY: ICD-10-CM

## 2022-07-20 DIAGNOSIS — Z01.419 GYNECOLOGIC EXAM NORMAL: Primary | ICD-10-CM

## 2022-07-20 PROCEDURE — 99396 PREV VISIT EST AGE 40-64: CPT | Performed by: OBSTETRICS & GYNECOLOGY

## 2022-07-20 RX ORDER — GLYCERIN/POLYCARBOPHL/CARBOMER
1 GEL WITH PREFILLED APPLICATOR (GRAM) VAGINAL AS NEEDED
Qty: 1 EACH | Refills: 3 | Status: SHIPPED | OUTPATIENT
Start: 2022-07-20

## 2022-07-20 RX ORDER — METOPROLOL TARTRATE 25 MG/1
25 TABLET, FILM COATED ORAL 2 TIMES DAILY
COMMUNITY

## 2022-07-20 NOTE — PROGRESS NOTES
Foreign Saravia is a 54 y.o. female, , Patient's last menstrual period was 2011., who presents today for the following:  Chief Complaint   Patient presents with    Annual Exam        Allergies   Allergen Reactions    Imitrex [Sumatriptan Succinate] Swelling     tongue       Current Outpatient Medications   Medication Sig    glycerin-polycarbophl-carbomer (RepHresh) gel Insert 1 g into vagina as needed for PRN Reason (Other) (symptoms). Yuvafem 10 mcg tab vaginal tablet INSERT 1 TABLET INTO VAGINA EVERY TUESDAY AND FRIDAY. rosuvastatin (CRESTOR) 10 mg tablet TAKE 1 TAB BY MOUTH NIGHTLY. STOP PRAVACHOL    levothyroxine (SYNTHROID) 112 mcg tablet One pill a day    losartan (COZAAR) 50 mg tablet Take 1 tablet every night    furosemide (LASIX) 40 mg tablet 1 tab daily    carvediloL (COREG) 6.25 mg tablet Take  by mouth two (2) times daily (with meals). multivitamin (ONE A DAY) tablet Take 1 Tablet by mouth daily. metoprolol tartrate (LOPRESSOR) 25 mg tablet Take 25 mg by mouth two (2) times a day. (Patient not taking: Reported on 2022)     No current facility-administered medications for this visit.        Past Medical History:   Diagnosis Date    Breast cancer ([address]     left side     Broken toe     left 5th toe    Hyperlipidemia     Hypertension     Hypopituitarism ([facility] [address]     Hypothyroidism        Past Surgical History:   Procedure Laterality Date    HX BREAST LUMPECTOMY      HX  SECTION      HX HYSTERECTOMY      HX HYSTERECTOMY      HX ORTHOPAEDIC Right     ankle       Family History   Problem Relation Age of Onset    Cancer Father     Diabetes Maternal Grandmother     Diabetes Paternal Grandmother        Social History     Socioeconomic History    Marital status:      Spouse name: Not on file    Number of children: Not on file    Years of education: Not on file    Highest education level: Not on file   Occupational History    Not on file   Tobacco Use    Smoking status: Never    Smokeless tobacco: Never   Substance and Sexual Activity    Alcohol use: Yes     Comment: beer, wine and liquor occassionally    Drug use: No    Sexual activity: Yes     Partners: Male     Birth control/protection: Surgical   Other Topics Concern     Service Not Asked    Blood Transfusions Not Asked    Caffeine Concern Not Asked    Occupational Exposure Not Asked    Hobby Hazards Not Asked    Sleep Concern Not Asked    Stress Concern Not Asked    Weight Concern Not Asked    Special Diet Not Asked    Back Care Not Asked    Exercise Not Asked    Bike Helmet Not Asked    Seat Belt Not Asked    Self-Exams Not Asked   Social History Narrative    Not on file     Social Determinants of Health     Financial Resource Strain: Not on file   Food Insecurity: Not on file   Transportation Needs: Not on file   Physical Activity: Not on file   Stress: Not on file   Social Connections: Not on file   Intimate Partner Violence: Not on file   Housing Stability: Not on file         Annual Exam  Annual     Review of Systems   Constitutional: Negative. Respiratory: Negative. Cardiovascular: Negative. Gastrointestinal: Negative. Genitourinary: Negative. Musculoskeletal: Negative. Skin: Negative. Neurological: Negative. Endo/Heme/Allergies: Negative. Psychiatric/Behavioral: Negative. All other systems reviewed and are negative. BP (!) 144/90 (BP 1 Location: Right upper arm, BP Patient Position: Sitting)   Pulse 84   Resp 16   Ht 5' (1.524 m)   Wt 181 lb (82.1 kg)   LMP 01/06/2011   SpO2 98%   BMI 35.35 kg/m²    OBGyn Exam   Constitutional:     General Appearance: healthy-appearing, well-nourished, and well-developed; Level of Distress: NAD. Ambulation: ambulating normally. Psychiatric:   Insight: good judgement. Mental Status: normal mood and affect and active and alert. Orientation: to time, place, and person.    Memory: recent memory normal and remote memory normal. Head: Head: normocephalic and atraumatic. Neck:   Neck: supple, FROM, trachea midline, and no masses. Lymph Nodes: no cervical LAD, supraclavicular LAD, axillary LAD, or inguinal LAD. Thyroid: no enlargement or nodules and non-tender. Lungs:   Respiratory effort: no dyspnea. Cardiovascular:     Pulses including femoral / pedal: normal throughout. Breast: Breast: no masses or abnormal secretions and normal appearance. Abdomen:    no tenderness, guarding, masses, rebound tenderness, or CVA tenderness and non-distended. Female :   External genitalia: no lesions or rash and normal.   Vagina: moist mucosa; Adnexae: no adnexal mass or tenderness and size WNL. Bladder and Urethra: normal bladder and urethra (except where noted). 1. Gynecologic exam normal    - glycerin-polycarbophl-carbomer (RepHresh) gel; Insert 1 g into vagina as needed for PRN Reason (Other) (symptoms). Dispense: 1 Each; Refill: 3  - PAP IG, RFX APTIMA HPV ASCUS ([id number])    2.  Screening for malignant neoplasm of vagina after total hysterectomy        Follow-up and Dispositions    Return in about 1 year (around 7/20/2023) for annual.

## 2022-07-20 NOTE — PATIENT INSTRUCTIONS
Breast Self-Exam: Care Instructions  Your Care Instructions     A breast self-exam is when you check your breasts for lumps or changes. This regular exam helps you learn how your breasts normally look and feel. Most breast problems or changes are not because of cancer. Breast self-exam is not a substitute for a mammogram. Having regular breast exams by your doctor and regular mammograms improve your chances of finding any problems with your breasts. Some women set a time each month to do a step-by-step breast self-exam. Other women like a less formal system. They might look at their breasts as they brush their teeth, or feel their breasts once in a while in the shower. If you notice a change in your breast, tell your doctor. Follow-up care is a key part of your treatment and safety. Be sure to make and go to all appointments, and call your doctor if you are having problems. It's also a good idea to know your test results and keep a list of the medicines you take. How do you do a breast self-exam?  The best time to examine your breasts is usually one week after your menstrual period begins. Your breasts should not be tender then. If you do not have periods, you might do your exam on a day of the month that is easy to remember. To examine your breasts:  Remove all your clothes above the waist and lie down. When you are lying down, your breast tissue spreads evenly over your chest wall, which makes it easier to feel all your breast tissue. Use the pads--not the fingertips--of the 3 middle fingers of your left hand to check your right breast. Move your fingers slowly in small coin-sized circles that overlap. Use three levels of pressure to feel of all your breast tissue. Use light pressure to feel the tissue close to the skin surface. Use medium pressure to feel a little deeper. Use firm pressure to feel your tissue close to your breastbone and ribs.  Use each pressure level to feel your breast tissue before moving on to the next spot. Check your entire breast, moving up and down as if following a strip from the collarbone to the bra line, and from the armpit to the ribs. Repeat until you have covered the entire breast.  Repeat this procedure for your left breast, using the pads of the 3 middle fingers of your right hand. To examine your breasts while in the shower:  Place one arm over your head and lightly soap your breast on that side. Using the pads of your fingers, gently move your hand over your breast (in the strip pattern described above), feeling carefully for any lumps or changes. Repeat for the other breast.  Have your doctor inspect anything you notice to see if you need further testing. Where can you learn more? Go to http://www.gray.com/  Enter P148 in the search box to learn more about \"Breast Self-Exam: Care Instructions. \"  Current as of: September 8, 2021               Content Version: 13.2  © 2006-2022 [employer]wise[employer]. Care instructions adapted under license by [email or web address] (which disclaims liability or warranty for this information). If you have questions about a medical condition or this instruction, always ask your healthcare professional. [place] [address] any warranty or liability for your use of this information.

## 2022-07-21 LAB
CYTOLOGIST CVX/VAG CYTO: NORMAL
CYTOLOGY CVX/VAG DOC CYTO: NORMAL
CYTOLOGY CVX/VAG DOC THIN PREP: NORMAL
DX ICD CODE: NORMAL
LABCORP, 190119: NORMAL
Lab: NORMAL
OTHER STN SPEC: NORMAL
STAT OF ADQ CVX/VAG CYTO-IMP: NORMAL

## 2023-04-21 LAB
25(OH)D3+25(OH)D2 SERPL-MCNC: 21.8 NG/ML (ref 30–100)
ALBUMIN SERPL-MCNC: 4.6 G/DL (ref 3.8–4.8)
ALBUMIN/GLOB SERPL: 1.7 {RATIO} (ref 1.2–2.2)
ALP SERPL-CCNC: 64 IU/L (ref 44–121)
ALT SERPL-CCNC: 12 IU/L (ref 0–32)
AST SERPL-CCNC: 16 IU/L (ref 0–40)
BILIRUB SERPL-MCNC: 0.5 MG/DL (ref 0–1.2)
BUN SERPL-MCNC: 16 MG/DL (ref 6–24)
BUN/CREAT SERPL: 22 (ref 9–23)
CALCIUM SERPL-MCNC: 9.4 MG/DL (ref 8.7–10.2)
CHLORIDE SERPL-SCNC: 104 MMOL/L (ref 96–106)
CO2 SERPL-SCNC: 23 MMOL/L (ref 20–29)
CREAT SERPL-MCNC: 0.74 MG/DL (ref 0.57–1)
EGFRCR SERPLBLD CKD-EPI 2021: 99 ML/MIN/1.73
GLOBULIN SER CALC-MCNC: 2.7 G/DL (ref 1.5–4.5)
GLUCOSE SERPL-MCNC: 84 MG/DL (ref 70–99)
POTASSIUM SERPL-SCNC: 4.1 MMOL/L (ref 3.5–5.2)
PROT SERPL-MCNC: 7.3 G/DL (ref 6–8.5)
SODIUM SERPL-SCNC: 142 MMOL/L (ref 134–144)
T4 FREE SERPL-MCNC: 1.85 NG/DL (ref 0.82–1.77)
TSH SERPL DL<=0.005 MIU/L-ACNC: 0.04 UIU/ML (ref 0.45–4.5)

## 2023-05-02 ENCOUNTER — OFFICE VISIT (OUTPATIENT)
Dept: ENDOCRINOLOGY | Age: 53
End: 2023-05-02
Payer: COMMERCIAL

## 2023-05-02 VITALS
BODY MASS INDEX: 34.87 KG/M2 | HEART RATE: 76 BPM | DIASTOLIC BLOOD PRESSURE: 88 MMHG | WEIGHT: 177.6 LBS | HEIGHT: 60 IN | SYSTOLIC BLOOD PRESSURE: 165 MMHG | RESPIRATION RATE: 16 BRPM | OXYGEN SATURATION: 100 % | TEMPERATURE: 98.6 F

## 2023-05-02 DIAGNOSIS — E23.6 EMPTY SELLA SYNDROME (HCC): Primary | ICD-10-CM

## 2023-05-02 DIAGNOSIS — L68.0 HIRSUTISM: ICD-10-CM

## 2023-05-02 DIAGNOSIS — E55.9 VITAMIN D DEFICIENCY: ICD-10-CM

## 2023-05-02 DIAGNOSIS — I10 ESSENTIAL HYPERTENSION: ICD-10-CM

## 2023-05-02 DIAGNOSIS — E78.5 HYPERLIPIDEMIA, UNSPECIFIED HYPERLIPIDEMIA TYPE: ICD-10-CM

## 2023-05-02 DIAGNOSIS — E03.9 HYPOTHYROIDISM, UNSPECIFIED TYPE: ICD-10-CM

## 2023-05-02 DIAGNOSIS — M81.0 SENILE OSTEOPOROSIS: ICD-10-CM

## 2023-05-02 PROCEDURE — 3079F DIAST BP 80-89 MM HG: CPT | Performed by: INTERNAL MEDICINE

## 2023-05-02 PROCEDURE — 3077F SYST BP >= 140 MM HG: CPT | Performed by: INTERNAL MEDICINE

## 2023-05-02 PROCEDURE — 99214 OFFICE O/P EST MOD 30 MIN: CPT | Performed by: INTERNAL MEDICINE

## 2023-05-02 RX ORDER — LEVOTHYROXINE SODIUM 112 UG/1
TABLET ORAL
Qty: 90 TABLET | Refills: 3 | Status: SHIPPED | OUTPATIENT
Start: 2023-05-02

## 2023-05-02 RX ORDER — ROSUVASTATIN CALCIUM 10 MG/1
TABLET, COATED ORAL
Qty: 90 TABLET | Refills: 3 | Status: SHIPPED | OUTPATIENT
Start: 2023-05-02

## 2023-05-09 ENCOUNTER — TRANSCRIBE ORDERS (OUTPATIENT)
Facility: HOSPITAL | Age: 53
End: 2023-05-09

## 2023-05-09 DIAGNOSIS — M81.0 SENILE OSTEOPOROSIS: Primary | ICD-10-CM

## 2023-06-01 ENCOUNTER — HOSPITAL ENCOUNTER (OUTPATIENT)
Facility: HOSPITAL | Age: 53
Discharge: HOME | End: 2023-06-01
Payer: COMMERCIAL

## 2023-06-01 DIAGNOSIS — M81.0 SENILE OSTEOPOROSIS: ICD-10-CM

## 2023-06-01 PROCEDURE — 77080 DXA BONE DENSITY AXIAL: CPT

## 2023-07-25 ENCOUNTER — OFFICE VISIT (OUTPATIENT)
Age: 53
End: 2023-07-25
Payer: COMMERCIAL

## 2023-07-25 VITALS
BODY MASS INDEX: 35.73 KG/M2 | DIASTOLIC BLOOD PRESSURE: 88 MMHG | RESPIRATION RATE: 16 BRPM | SYSTOLIC BLOOD PRESSURE: 147 MMHG | HEIGHT: 60 IN | TEMPERATURE: 98 F | HEART RATE: 66 BPM | OXYGEN SATURATION: 98 % | WEIGHT: 182 LBS

## 2023-07-25 DIAGNOSIS — Z12.39 BREAST SCREENING: ICD-10-CM

## 2023-07-25 DIAGNOSIS — Z12.72 ENCOUNTER FOR SCREENING FOR MALIGNANT NEOPLASM OF VAGINA: ICD-10-CM

## 2023-07-25 DIAGNOSIS — Z01.419 ENCOUNTER FOR GYNECOLOGICAL EXAMINATION (GENERAL) (ROUTINE) WITHOUT ABNORMAL FINDINGS: Primary | ICD-10-CM

## 2023-07-25 DIAGNOSIS — Z78.0 MENOPAUSE: ICD-10-CM

## 2023-07-25 DIAGNOSIS — G43.919 INTRACTABLE MIGRAINE WITHOUT STATUS MIGRAINOSUS, UNSPECIFIED MIGRAINE TYPE: ICD-10-CM

## 2023-07-25 PROCEDURE — 99396 PREV VISIT EST AGE 40-64: CPT | Performed by: OBSTETRICS & GYNECOLOGY

## 2023-07-25 PROCEDURE — 3077F SYST BP >= 140 MM HG: CPT | Performed by: OBSTETRICS & GYNECOLOGY

## 2023-07-25 PROCEDURE — 3079F DIAST BP 80-89 MM HG: CPT | Performed by: OBSTETRICS & GYNECOLOGY

## 2023-07-25 RX ORDER — BUTALBITAL, ACETAMINOPHEN AND CAFFEINE 50; 325; 40 MG/1; MG/1; MG/1
1 TABLET ORAL EVERY 6 HOURS PRN
Qty: 30 TABLET | Refills: 3 | Status: SHIPPED | OUTPATIENT
Start: 2023-07-25 | End: 2023-07-25 | Stop reason: SDUPTHER

## 2023-07-25 RX ORDER — ESTRADIOL 10 UG/1
INSERT VAGINAL
Qty: 8 TABLET | Refills: 10 | Status: SHIPPED | OUTPATIENT
Start: 2023-07-25

## 2023-07-25 RX ORDER — BUTALBITAL, ACETAMINOPHEN AND CAFFEINE 50; 325; 40 MG/1; MG/1; MG/1
1 TABLET ORAL EVERY 6 HOURS PRN
Qty: 30 TABLET | Refills: 3 | Status: SHIPPED | OUTPATIENT
Start: 2023-07-25

## 2023-07-30 LAB
CYTOLOGIST CVX/VAG CYTO: NORMAL
CYTOLOGY CVX/VAG DOC CYTO: NORMAL
CYTOLOGY CVX/VAG DOC THIN PREP: NORMAL
DX ICD CODE: NORMAL
HPV GENOTYPE REFLEX: NORMAL
HPV I/H RISK 4 DNA CVX QL PROBE+SIG AMP: NEGATIVE
Lab: NORMAL
OTHER STN SPEC: NORMAL
SPECIMEN STATUS REPORT: NORMAL
STAT OF ADQ CVX/VAG CYTO-IMP: NORMAL

## 2024-04-08 DIAGNOSIS — E03.9 HYPOTHYROIDISM, UNSPECIFIED TYPE: Primary | ICD-10-CM

## 2024-04-08 RX ORDER — LEVOTHYROXINE SODIUM 112 UG/1
112 TABLET ORAL DAILY
Qty: 90 TABLET | Refills: 3 | Status: SHIPPED | OUTPATIENT
Start: 2024-04-08

## 2024-04-12 DIAGNOSIS — E23.6 OTHER DISORDERS OF PITUITARY GLAND (HCC): ICD-10-CM

## 2024-04-12 DIAGNOSIS — L68.0 HIRSUTISM: ICD-10-CM

## 2024-04-12 DIAGNOSIS — E03.9 HYPOTHYROIDISM, UNSPECIFIED TYPE: Primary | ICD-10-CM

## 2024-04-12 DIAGNOSIS — M81.0 AGE-RELATED OSTEOPOROSIS WITHOUT CURRENT PATHOLOGICAL FRACTURE: ICD-10-CM

## 2024-04-20 LAB
ALBUMIN/CREAT UR: 10 MG/G CREAT (ref 0–29)
CHOLEST SERPL-MCNC: 209 MG/DL (ref 100–199)
CREAT UR-MCNC: 249.1 MG/DL
HBA1C MFR BLD: 5.5 % (ref 4.8–5.6)
HDLC SERPL-MCNC: 80 MG/DL
LDLC SERPL CALC-MCNC: 116 MG/DL (ref 0–99)
MICROALBUMIN UR-MCNC: 24.4 UG/ML
T4 FREE SERPL-MCNC: 1.2 NG/DL (ref 0.82–1.77)
TRIGL SERPL-MCNC: 72 MG/DL (ref 0–149)
TSH SERPL DL<=0.005 MIU/L-ACNC: 0.12 UIU/ML (ref 0.45–4.5)
VLDLC SERPL CALC-MCNC: 13 MG/DL (ref 5–40)

## 2024-04-21 LAB
ALBUMIN SERPL-MCNC: 4.7 G/DL (ref 3.8–4.9)
ALBUMIN/GLOB SERPL: 1.5 {RATIO} (ref 1.2–2.2)
ALP SERPL-CCNC: 76 IU/L (ref 44–121)
ALT SERPL-CCNC: 14 IU/L (ref 0–32)
AST SERPL-CCNC: 18 IU/L (ref 0–40)
BILIRUB SERPL-MCNC: 0.3 MG/DL (ref 0–1.2)
BUN SERPL-MCNC: 11 MG/DL (ref 6–24)
BUN/CREAT SERPL: 12 (ref 9–23)
CALCIUM SERPL-MCNC: 9.8 MG/DL (ref 8.7–10.2)
CHLORIDE SERPL-SCNC: 107 MMOL/L (ref 96–106)
CO2 SERPL-SCNC: 22 MMOL/L (ref 20–29)
CREAT SERPL-MCNC: 0.89 MG/DL (ref 0.57–1)
EGFRCR SERPLBLD CKD-EPI 2021: 78 ML/MIN/1.73
GLOBULIN SER CALC-MCNC: 3.1 G/DL (ref 1.5–4.5)
GLUCOSE SERPL-MCNC: 94 MG/DL (ref 70–99)
POTASSIUM SERPL-SCNC: 4.2 MMOL/L (ref 3.5–5.2)
PROT SERPL-MCNC: 7.8 G/DL (ref 6–8.5)
SODIUM SERPL-SCNC: 145 MMOL/L (ref 134–144)

## 2024-04-22 LAB
25(OH)D3+25(OH)D2 SERPL-MCNC: 32.5 NG/ML (ref 30–100)
IMP & REVIEW OF LAB RESULTS: NORMAL

## 2024-05-08 ENCOUNTER — OFFICE VISIT (OUTPATIENT)
Age: 54
End: 2024-05-08
Payer: COMMERCIAL

## 2024-05-08 VITALS
SYSTOLIC BLOOD PRESSURE: 137 MMHG | HEIGHT: 60 IN | BODY MASS INDEX: 36.24 KG/M2 | OXYGEN SATURATION: 96 % | DIASTOLIC BLOOD PRESSURE: 84 MMHG | TEMPERATURE: 97.3 F | HEART RATE: 88 BPM | WEIGHT: 184.6 LBS

## 2024-05-08 DIAGNOSIS — E23.6 OTHER DISORDERS OF PITUITARY GLAND (HCC): ICD-10-CM

## 2024-05-08 DIAGNOSIS — E03.9 HYPOTHYROIDISM, UNSPECIFIED TYPE: Primary | ICD-10-CM

## 2024-05-08 DIAGNOSIS — M81.0 AGE-RELATED OSTEOPOROSIS WITHOUT CURRENT PATHOLOGICAL FRACTURE: ICD-10-CM

## 2024-05-08 DIAGNOSIS — E55.9 VITAMIN D DEFICIENCY, UNSPECIFIED: ICD-10-CM

## 2024-05-08 PROCEDURE — 3075F SYST BP GE 130 - 139MM HG: CPT | Performed by: INTERNAL MEDICINE

## 2024-05-08 PROCEDURE — 3079F DIAST BP 80-89 MM HG: CPT | Performed by: INTERNAL MEDICINE

## 2024-05-08 PROCEDURE — 99214 OFFICE O/P EST MOD 30 MIN: CPT | Performed by: INTERNAL MEDICINE

## 2024-05-08 NOTE — PROGRESS NOTES
[facility] DIABETES AND ENDOCRINOLOGY                Shakira Richards MD FACE          HISTORY OF PRESENT ILLNESS          Sena Randall is a 53   y.o. female.    HPI    YEARLY  F/u visit for pan hypopituitarism/ hypothyroidism   And  Osteoporosis  after  May 2  2023        Breast cancer in remission ( she had MRI - results pending  )    She wants to grow scalp hair    She did not get call for reclast , but she had dexa last year in June 2023        May 2023      She is explaining how her day goes    She says thyroid med is always taken with all meds    She misses the chl medication -          Nov 2021       Tolerated the reclast infusion in June 2021          Old history :      She got diagnosed with breast cancer, Finished chemo and radiation    She f/u with Dr. Mckay   She could nto continue on herceptin because of CHF    No hospitalizations    tolerating 112 mcg a day    She stopped the GH treatments   She other wise feels good    Weight gain of 5  lb             Review of Systems    Constitutional: Negative.    HENT: c/o post nasal drip     Psychiatric/Behavioral: Negative for depression and memory loss. The patient does not have insomnia.             Physical Exam    Constitutional: She is oriented to person, place, and time. She appears well-developed and well-nourished.    Neurological: She is alert and oriented to person, place, and time. She has normal reflexes.             Labs    Thyroid    Lab Results   Component Value Date    TSH 0.123 (L) 04/19/2024    T4FREE 1.20 04/19/2024                     ASSESSMENT AND PLAN:          1. Hypopituitarism/partial empty sella syndrome/Xavi's disease.       a. Central hypothyroidism.   May 2024 - continue on same dose synthroid 112 mcg a day      TSH is suppressed but not reliable amongst  central hypothyrodi patients          b. Hypogonadism.   OCP is stopped for fear of DVT   And ofcourse never could get back because of breast cancer  It has

## 2024-05-08 NOTE — PATIENT INSTRUCTIONS
SPECIFIC INSTRUCTIONS BELOW     synthrodi 112 mcg a day      on empty stomach with water only, no other meds or food or drinks   For next half hour   Take any kind of vitamins, calcium, iron   Pills  4 hours later     ---------------------------------------------------------------------------------------------------        calcium and vit  Twice a day   ( VIACTIV )         Reclast  Infusion  -   After  June 1st 2024              -------------PAY ATTENTION TO THESE GENERAL INSTRUCTIONS -----------------      - The medications prescribed at this visit will not be available at pharmacy until 6 pm today        - your med list is not updated until the visit encounter is closed, so FOLLOW THE TYPED SPECIFIC INSTRUCTIONS  ABOVE     -ANY tests other than blood work, which you opt to do  outside the  [facility] imaging facilities, you are responsible for prior authorizations if  required    - health maintenance will not be updated  on AVS, so please ignore it       Results     *Normal results will not be notified by a phone call starting January 1 2024   *If you have an upcoming visit, the results will be discussed at the visit   *Please sign up for MY CHART if you want access to your lab and test results  *Abnormal results which require immediate attention will be notified by phone call   *Abnormal results which do not require immediate assistance will be notified in 1-2 weeks       Refills    -    have your pharmacy send us a refill request . Refills are done max for one year and a visit is a must before refills are extended    Follow up appointments -  highly encourage you to make it when you are checking out. We can accommodate you into the schedule based on your clinical situation, but not for extending refills beyond a year. Labs are important to give refills and it is important to get labs before the visit     Phone calls  -  Allow  24 hrs. for non-urgent calls to be returned  Prior authorization - It may take 2

## 2024-05-09 LAB
BUN SERPL-MCNC: 11 MG/DL (ref 6–24)
BUN/CREAT SERPL: 14 (ref 9–23)
CALCIUM SERPL-MCNC: 9.6 MG/DL (ref 8.7–10.2)
CHLORIDE SERPL-SCNC: 105 MMOL/L (ref 96–106)
CO2 SERPL-SCNC: 24 MMOL/L (ref 20–29)
CREAT SERPL-MCNC: 0.81 MG/DL (ref 0.57–1)
EGFRCR SERPLBLD CKD-EPI 2021: 88 ML/MIN/1.73
GLUCOSE SERPL-MCNC: 83 MG/DL (ref 70–99)
POTASSIUM SERPL-SCNC: 4.2 MMOL/L (ref 3.5–5.2)
SODIUM SERPL-SCNC: 143 MMOL/L (ref 134–144)

## 2024-06-03 ENCOUNTER — HOSPITAL ENCOUNTER (OUTPATIENT)
Facility: HOSPITAL | Age: 54
Setting detail: INFUSION SERIES
Discharge: HOME | End: 2024-06-03
Payer: COMMERCIAL

## 2024-06-03 VITALS
RESPIRATION RATE: 18 BRPM | BODY MASS INDEX: 36.12 KG/M2 | HEART RATE: 67 BPM | OXYGEN SATURATION: 100 % | HEIGHT: 60 IN | SYSTOLIC BLOOD PRESSURE: 144 MMHG | WEIGHT: 184 LBS | TEMPERATURE: 98.2 F | DIASTOLIC BLOOD PRESSURE: 81 MMHG

## 2024-06-03 PROCEDURE — 96374 THER/PROPH/DIAG INJ IV PUSH: CPT

## 2024-06-03 PROCEDURE — 6360000002 HC RX W HCPCS: Performed by: INTERNAL MEDICINE

## 2024-06-03 RX ORDER — ZOLEDRONIC ACID 5 MG/100ML
5 INJECTION, SOLUTION INTRAVENOUS ONCE
Status: COMPLETED | OUTPATIENT
Start: 2024-06-03 | End: 2024-06-03

## 2024-06-03 RX ORDER — ZOLEDRONIC ACID 5 MG/100ML
5 INJECTION, SOLUTION INTRAVENOUS ONCE
Status: DISCONTINUED | OUTPATIENT
Start: 2024-06-03 | End: 2024-06-03

## 2024-06-03 RX ADMIN — ZOLEDRONIC ACID 5 MG: 0.05 INJECTION, SOLUTION INTRAVENOUS at 13:13

## 2024-06-03 NOTE — PROGRESS NOTES
Patient to [facility] for yearly infusion. Ambulated independently on unit. VSS. Ultrasound used for PIV placement. Call bell within reach, snack/drink given. Will continue to monitor.     1255: Pharmacy called eladio this nurse for reclast medication update to when it is ready.

## 2024-07-24 DIAGNOSIS — G43.919 INTRACTABLE MIGRAINE WITHOUT STATUS MIGRAINOSUS, UNSPECIFIED MIGRAINE TYPE: ICD-10-CM

## 2024-07-25 RX ORDER — BUTALBITAL, ACETAMINOPHEN AND CAFFEINE 50; 325; 40 MG/1; MG/1; MG/1
TABLET ORAL
Qty: 30 TABLET | Refills: 0 | Status: SHIPPED | OUTPATIENT
Start: 2024-07-25

## 2024-07-29 ENCOUNTER — OFFICE VISIT (OUTPATIENT)
Age: 54
End: 2024-07-29
Payer: COMMERCIAL

## 2024-07-29 VITALS
OXYGEN SATURATION: 98 % | BODY MASS INDEX: 36.52 KG/M2 | RESPIRATION RATE: 18 BRPM | WEIGHT: 186 LBS | SYSTOLIC BLOOD PRESSURE: 155 MMHG | HEART RATE: 87 BPM | HEIGHT: 60 IN | DIASTOLIC BLOOD PRESSURE: 94 MMHG

## 2024-07-29 DIAGNOSIS — Z01.419 ENCOUNTER FOR GYNECOLOGICAL EXAMINATION (GENERAL) (ROUTINE) WITHOUT ABNORMAL FINDINGS: Primary | ICD-10-CM

## 2024-07-29 DIAGNOSIS — Z12.72 ENCOUNTER FOR SCREENING FOR MALIGNANT NEOPLASM OF VAGINA: ICD-10-CM

## 2024-07-29 PROCEDURE — 3077F SYST BP >= 140 MM HG: CPT | Performed by: OBSTETRICS & GYNECOLOGY

## 2024-07-29 PROCEDURE — 3080F DIAST BP >= 90 MM HG: CPT | Performed by: OBSTETRICS & GYNECOLOGY

## 2024-07-29 PROCEDURE — 99396 PREV VISIT EST AGE 40-64: CPT | Performed by: OBSTETRICS & GYNECOLOGY

## 2024-07-29 SDOH — ECONOMIC STABILITY: FOOD INSECURITY: WITHIN THE PAST 12 MONTHS, YOU WORRIED THAT YOUR FOOD WOULD RUN OUT BEFORE YOU GOT MONEY TO BUY MORE.: NEVER TRUE

## 2024-07-29 SDOH — ECONOMIC STABILITY: HOUSING INSECURITY
IN THE LAST 12 MONTHS, WAS THERE A TIME WHEN YOU DID NOT HAVE A STEADY PLACE TO SLEEP OR SLEPT IN A SHELTER (INCLUDING NOW)?: NO

## 2024-07-29 SDOH — ECONOMIC STABILITY: FOOD INSECURITY: WITHIN THE PAST 12 MONTHS, THE FOOD YOU BOUGHT JUST DIDN'T LAST AND YOU DIDN'T HAVE MONEY TO GET MORE.: NEVER TRUE

## 2024-07-29 SDOH — ECONOMIC STABILITY: INCOME INSECURITY: HOW HARD IS IT FOR YOU TO PAY FOR THE VERY BASICS LIKE FOOD, HOUSING, MEDICAL CARE, AND HEATING?: NOT HARD AT ALL

## 2024-07-29 ASSESSMENT — PATIENT HEALTH QUESTIONNAIRE - PHQ9
7. TROUBLE CONCENTRATING ON THINGS, SUCH AS READING THE NEWSPAPER OR WATCHING TELEVISION: NOT AT ALL
10. IF YOU CHECKED OFF ANY PROBLEMS, HOW DIFFICULT HAVE THESE PROBLEMS MADE IT FOR YOU TO DO YOUR WORK, TAKE CARE OF THINGS AT HOME, OR GET ALONG WITH OTHER PEOPLE: NOT DIFFICULT AT ALL
SUM OF ALL RESPONSES TO PHQ QUESTIONS 1-9: 3
SUM OF ALL RESPONSES TO PHQ QUESTIONS 1-9: 3
6. FEELING BAD ABOUT YOURSELF - OR THAT YOU ARE A FAILURE OR HAVE LET YOURSELF OR YOUR FAMILY DOWN: SEVERAL DAYS
SUM OF ALL RESPONSES TO PHQ QUESTIONS 1-9: 3
4. FEELING TIRED OR HAVING LITTLE ENERGY: NOT AT ALL
2. FEELING DOWN, DEPRESSED OR HOPELESS: SEVERAL DAYS
9. THOUGHTS THAT YOU WOULD BE BETTER OFF DEAD, OR OF HURTING YOURSELF: NOT AT ALL
8. MOVING OR SPEAKING SO SLOWLY THAT OTHER PEOPLE COULD HAVE NOTICED. OR THE OPPOSITE, BEING SO FIGETY OR RESTLESS THAT YOU HAVE BEEN MOVING AROUND A LOT MORE THAN USUAL: NOT AT ALL
1. LITTLE INTEREST OR PLEASURE IN DOING THINGS: SEVERAL DAYS
SUM OF ALL RESPONSES TO PHQ QUESTIONS 1-9: 3
3. TROUBLE FALLING OR STAYING ASLEEP: NOT AT ALL
SUM OF ALL RESPONSES TO PHQ9 QUESTIONS 1 & 2: 2
5. POOR APPETITE OR OVEREATING: NOT AT ALL

## 2024-07-29 NOTE — PROGRESS NOTES
1. \"Have you been to the ER, urgent care clinic since your last visit?  Hospitalized since your last visit?\" No    2. \"Have you seen or consulted any other health care providers outside of the [facility] since your last visit?\" No     3. For patients aged 45-75: Has the patient had a colonoscopy / FIT/ Cologuard? Yes - no Care Gap present      If the patient is female:    4. For patients aged 40-74: Has the patient had a mammogram within the past 2 years? Yes - no Care Gap present      5. For patients aged 21-65: Has the patient had a pap smear? Yes - no Care Gap present

## 2024-07-29 NOTE — PROGRESS NOTES
Sena RamosKatelynRodolfo is a 53 y.o. female, , No LMP recorded. Patient has had a hysterectomy., who presents today for the following:  Chief Complaint   Patient presents with   • Annual Exam        Allergies   Allergen Reactions   • Sumatriptan Swelling and Angioedema     tongue  Other reaction(s): swelling of tounge  Reaction Type: Allergy  tongue     • Ace Inhibitors      Other reaction(s): benazepril; headache       Current Outpatient Medications   Medication Sig Dispense Refill   • butalbital-acetaminophen-caffeine (FIORICET, ESGIC) -40 MG per tablet TAKE 1 TABLET BY MOUTH EVERY 6 HOURS AS NEEDED FOR HEADACHE 30 tablet 0   • levothyroxine (SYNTHROID) 112 MCG tablet TAKE 1 TABLET DAILY 90 tablet 3   • carvedilol (COREG) 6.25 MG tablet Take by mouth 2 times daily (with meals)     • furosemide (LASIX) 40 MG tablet 1 tab daily     • losartan (COZAAR) 50 MG tablet Take 1 tablet every night     • rosuvastatin (CRESTOR) 10 MG tablet TAKE 1 TAB BY MOUTH NIGHTLY. STOP PRAVACHOL       No current facility-administered medications for this visit.         Past Medical History:   Diagnosis Date   • Breast cancer (HCC)     left side    • Broken toe     left 5th toe   • Hyperlipidemia    • Hypertension    • Hypopituitarism (HCC)    • Hypothyroidism        Past Surgical History:   Procedure Laterality Date   • BREAST LUMPECTOMY     •  SECTION     • COLONOSCOPY  2024    1 polyp   • HYSTERECTOMY (CERVIX STATUS UNKNOWN)     • HYSTERECTOMY (CERVIX STATUS UNKNOWN)     • ORTHOPEDIC SURGERY Right     ankle       Family History   Problem Relation Age of Onset   • Cancer Father    • Diabetes Maternal Grandmother    • Diabetes Paternal Grandmother        Social History     Socioeconomic History   • Marital status:      Spouse name: Not on file   • Number of children: Not on file   • Years of education: Not on file   • Highest education level: Not on file   Occupational History   • Not on file   Tobacco Use   •

## 2024-08-02 LAB
CYTOLOGIST CVX/VAG CYTO: NORMAL
CYTOLOGY CVX/VAG DOC CYTO: NORMAL
CYTOLOGY CVX/VAG DOC THIN PREP: NORMAL
DX ICD CODE: NORMAL
HPV GENOTYPE REFLEX: NORMAL
HPV I/H RISK 4 DNA CVX QL PROBE+SIG AMP: NEGATIVE
Lab: NORMAL
Lab: NORMAL
OTHER STN SPEC: NORMAL
STAT OF ADQ CVX/VAG CYTO-IMP: NORMAL

## 2024-08-29 DIAGNOSIS — E78.2 MIXED HYPERLIPIDEMIA: Primary | ICD-10-CM

## 2024-08-29 DIAGNOSIS — I10 ESSENTIAL (PRIMARY) HYPERTENSION: ICD-10-CM

## 2024-08-30 RX ORDER — ROSUVASTATIN CALCIUM 10 MG/1
10 TABLET, COATED ORAL NIGHTLY
Qty: 90 TABLET | Refills: 3 | Status: SHIPPED | OUTPATIENT
Start: 2024-08-30

## 2024-10-16 DIAGNOSIS — G43.919 INTRACTABLE MIGRAINE WITHOUT STATUS MIGRAINOSUS, UNSPECIFIED MIGRAINE TYPE: ICD-10-CM

## 2024-10-23 DIAGNOSIS — G43.919 INTRACTABLE MIGRAINE WITHOUT STATUS MIGRAINOSUS, UNSPECIFIED MIGRAINE TYPE: ICD-10-CM

## 2024-10-27 RX ORDER — BUTALBITAL, ACETAMINOPHEN AND CAFFEINE 50; 325; 40 MG/1; MG/1; MG/1
1 TABLET ORAL EVERY 6 HOURS PRN
Qty: 30 TABLET | Refills: 0 | Status: SHIPPED | OUTPATIENT
Start: 2024-10-27

## 2024-10-27 RX ORDER — BUTALBITAL, ACETAMINOPHEN AND CAFFEINE 50; 325; 40 MG/1; MG/1; MG/1
TABLET ORAL
Qty: 30 TABLET | Refills: 0 | Status: SHIPPED | OUTPATIENT
Start: 2024-10-27

## 2025-05-03 LAB
25(OH)D3+25(OH)D2 SERPL-MCNC: 43.7 NG/ML (ref 30–100)
ALBUMIN SERPL-MCNC: 4.5 G/DL (ref 3.8–4.9)
ALP SERPL-CCNC: 69 IU/L (ref 44–121)
ALT SERPL-CCNC: 16 IU/L (ref 0–32)
AST SERPL-CCNC: 17 IU/L (ref 0–40)
BILIRUB SERPL-MCNC: 0.5 MG/DL (ref 0–1.2)
BUN SERPL-MCNC: 18 MG/DL (ref 6–24)
BUN/CREAT SERPL: 18 (ref 9–23)
CALCIUM SERPL-MCNC: 9.7 MG/DL (ref 8.7–10.2)
CHLORIDE SERPL-SCNC: 101 MMOL/L (ref 96–106)
CO2 SERPL-SCNC: 24 MMOL/L (ref 20–29)
CREAT SERPL-MCNC: 1 MG/DL (ref 0.57–1)
EGFRCR SERPLBLD CKD-EPI 2021: 68 ML/MIN/1.73
GLOBULIN SER CALC-MCNC: 3.1 G/DL (ref 1.5–4.5)
GLUCOSE SERPL-MCNC: 85 MG/DL (ref 70–99)
POTASSIUM SERPL-SCNC: 4 MMOL/L (ref 3.5–5.2)
PROT SERPL-MCNC: 7.6 G/DL (ref 6–8.5)
SODIUM SERPL-SCNC: 141 MMOL/L (ref 134–144)
T4 FREE SERPL-MCNC: 1.29 NG/DL (ref 0.82–1.77)

## 2025-05-14 ENCOUNTER — OFFICE VISIT (OUTPATIENT)
Age: 55
End: 2025-05-14
Payer: COMMERCIAL

## 2025-05-14 VITALS
HEART RATE: 75 BPM | TEMPERATURE: 97.7 F | BODY MASS INDEX: 34 KG/M2 | WEIGHT: 173.2 LBS | RESPIRATION RATE: 18 BRPM | SYSTOLIC BLOOD PRESSURE: 125 MMHG | HEIGHT: 60 IN | DIASTOLIC BLOOD PRESSURE: 76 MMHG | OXYGEN SATURATION: 100 %

## 2025-05-14 DIAGNOSIS — I10 ESSENTIAL (PRIMARY) HYPERTENSION: ICD-10-CM

## 2025-05-14 DIAGNOSIS — M81.0 AGE-RELATED OSTEOPOROSIS WITHOUT CURRENT PATHOLOGICAL FRACTURE: ICD-10-CM

## 2025-05-14 DIAGNOSIS — G43.919 INTRACTABLE MIGRAINE WITHOUT STATUS MIGRAINOSUS, UNSPECIFIED MIGRAINE TYPE: ICD-10-CM

## 2025-05-14 DIAGNOSIS — E03.9 HYPOTHYROIDISM, UNSPECIFIED TYPE: Primary | ICD-10-CM

## 2025-05-14 DIAGNOSIS — E78.2 MIXED HYPERLIPIDEMIA: ICD-10-CM

## 2025-05-14 PROCEDURE — 3078F DIAST BP <80 MM HG: CPT | Performed by: INTERNAL MEDICINE

## 2025-05-14 PROCEDURE — 3074F SYST BP LT 130 MM HG: CPT | Performed by: INTERNAL MEDICINE

## 2025-05-14 PROCEDURE — 99214 OFFICE O/P EST MOD 30 MIN: CPT | Performed by: INTERNAL MEDICINE

## 2025-05-14 RX ORDER — LEVOTHYROXINE SODIUM 112 UG/1
112 TABLET ORAL DAILY
Qty: 90 TABLET | Refills: 3 | Status: SHIPPED | OUTPATIENT
Start: 2025-05-14 | End: 2025-05-14 | Stop reason: SDUPTHER

## 2025-05-14 RX ORDER — LEVOTHYROXINE SODIUM 112 UG/1
112 TABLET ORAL DAILY
Qty: 90 TABLET | Refills: 3 | Status: SHIPPED | OUTPATIENT
Start: 2025-05-14

## 2025-05-14 RX ORDER — ROSUVASTATIN CALCIUM 10 MG/1
10 TABLET, COATED ORAL NIGHTLY
Qty: 90 TABLET | Refills: 3 | Status: SHIPPED | OUTPATIENT
Start: 2025-05-14

## 2025-05-14 RX ORDER — BUTALBITAL, ACETAMINOPHEN AND CAFFEINE 50; 325; 40 MG/1; MG/1; MG/1
1 TABLET ORAL EVERY 6 HOURS PRN
Qty: 30 TABLET | Refills: 1 | Status: SHIPPED | OUTPATIENT
Start: 2025-05-14

## 2025-05-14 NOTE — PROGRESS NOTES
Sena STU Mendieta is a 54 y.o. female here for   Chief Complaint   Patient presents with    Thyroid Problem    Osteoporosis       1. Have you been to the ER, urgent care clinic since your last visit?  Hospitalized since your last visit? -[facility]    2. Have you seen or consulted any other health care providers outside of the [facility] since your last visit?  Include any pap smears or colon screening.-cardiology and PCP    
central hypothyrodi patients          b. Hypogonadism.   OCP is stopped for fear of DVT   And ofcourse never could get back because of breast cancer  It has been 15 years with  premature menopause          c. Growth hormone deficiency.   She stopped it as she is diagnosed with Breast cancer sept 2015          D. ACTH stim test- she passed  The test  indicating no adrenal insufficiency         2. CHF - from hirceptin          3. osteopenia :    Continue on calcium supplementation.    s/p toe fracture on left pinky toe  (Traumatic )   S/p ankle fracture on right side ( had a fall )   She had breast cancer treatment with anti-estrogen therapy       March 2020    t scores - -1.2 ; -1.0   (  z scores - 2.2  ; On spine   -1.6 )      I am considering I/v reclast , but patient preferred oral medications, Weekly fosamax was started. As she had side effects from it - muscle spasms, she opted  reclast      First infusion in [place] 3 2021 ,    June 8 2022 - second infusion , third infusion - June 2023   MISSED  ,   June 2024        Due for dexa in  march 2022 , she missed it - ordered it again   Date : June 2023    Bone DEXA  ap spine T-score -0.6 ; left femoral Neck T- score  -0.7 , right femoral neck T-score n/a   there has been a significant 5.1% increase in the lumbar spine and a significant 7.4% increase in the right total hip      Due for   DEXA  - June 2025            She is NOT taking vitamins  ( smells bad )    She tried vitamin patches    Stopped   estradiol vagifem twice a  Week          4,. Hyperlipidemia :compliant with  crestor 10 mg at night          5. Breast cancer , left side, lumpectomy    F/u with Dr. Mckay,  BRCA positive stage 1   Had  local irradiation ,  finished chemo    Being HR positive, she could not take herceptin because of CHF    No Lymphnodes            6.  Hirsutism : wanted to resume  spironolactone but she felt that  Small amount of vagifem has helped her   May 2024  - she wants to have

## 2025-05-14 NOTE — PATIENT INSTRUCTIONS
SPECIFIC INSTRUCTIONS BELOW     Levo thyroxine 112 mcg a day      on empty stomach with water only, no other meds or food or drinks   For next half hour   Take any kind of vitamins, calcium, iron   Pills  4 hours later     ---------------------------------------------------------------------------------------------------        calcium and vit  Twice a day   ( VIACTIV )         Reclast  Infusion  -    will b decided based on dexa               -------------PAY ATTENTION TO THESE GENERAL INSTRUCTIONS -----------------      - The medications prescribed at this visit will not be available at pharmacy until 6 pm today        - your med list is not updated until the visit encounter is closed, so FOLLOW THE TYPED SPECIFIC INSTRUCTIONS  ABOVE     -ANY tests other than blood work, which you opt to do  outside the  [facility] imaging facilities, you are responsible for prior authorizations if  required    - health maintenance will not be updated  on AVS, so please ignore it       Results     *Normal results will not be notified by a phone call starting January 1 2024   *If you have an upcoming visit, the results will be discussed at the visit   *Please sign up for MY CHART if you want access to your lab and test results  *Abnormal results which require immediate attention will be notified by phone call   *Abnormal results which do not require immediate assistance will be notified in 1-2 weeks       Refills    -    have your pharmacy send us a refill request . Refills are done max for one year and a visit is a must before refills are extended    Follow up appointments -  highly encourage you to make it when you are checking out. We can accommodate you into the schedule based on your clinical situation, but not for extending refills beyond a year. Labs are important to give refills and it is important to get labs before the visit     Phone calls  -  Allow  24 hrs. for non-urgent calls to be returned  Prior authorization - It

## 2025-06-04 ENCOUNTER — HOSPITAL ENCOUNTER (OUTPATIENT)
Facility: HOSPITAL | Age: 55
Discharge: HOME | End: 2025-06-07
Attending: INTERNAL MEDICINE
Payer: COMMERCIAL

## 2025-06-04 DIAGNOSIS — M81.0 AGE-RELATED OSTEOPOROSIS WITHOUT CURRENT PATHOLOGICAL FRACTURE: ICD-10-CM

## 2025-06-04 PROCEDURE — 77080 DXA BONE DENSITY AXIAL: CPT

## 2025-06-15 ENCOUNTER — RESULTS FOLLOW-UP (OUTPATIENT)
Age: 55
End: 2025-06-15

## 2025-08-21 ENCOUNTER — OFFICE VISIT (OUTPATIENT)
Age: 55
End: 2025-08-21
Payer: COMMERCIAL

## 2025-08-21 VITALS
DIASTOLIC BLOOD PRESSURE: 90 MMHG | HEIGHT: 60 IN | RESPIRATION RATE: 18 BRPM | HEART RATE: 89 BPM | OXYGEN SATURATION: 100 % | TEMPERATURE: 98.3 F | SYSTOLIC BLOOD PRESSURE: 147 MMHG | BODY MASS INDEX: 32.02 KG/M2 | WEIGHT: 163.1 LBS

## 2025-08-21 DIAGNOSIS — N89.8 VAGINAL DISCHARGE: ICD-10-CM

## 2025-08-21 DIAGNOSIS — Z12.72 ENCOUNTER FOR SCREENING FOR MALIGNANT NEOPLASM OF VAGINA: ICD-10-CM

## 2025-08-21 DIAGNOSIS — Z01.419 ENCOUNTER FOR GYNECOLOGICAL EXAMINATION (GENERAL) (ROUTINE) WITHOUT ABNORMAL FINDINGS: Primary | ICD-10-CM

## 2025-08-21 DIAGNOSIS — Z12.31 OTHER SCREENING MAMMOGRAM: ICD-10-CM

## 2025-08-21 PROCEDURE — 3077F SYST BP >= 140 MM HG: CPT | Performed by: OBSTETRICS & GYNECOLOGY

## 2025-08-21 PROCEDURE — 3080F DIAST BP >= 90 MM HG: CPT | Performed by: OBSTETRICS & GYNECOLOGY

## 2025-08-21 PROCEDURE — 99396 PREV VISIT EST AGE 40-64: CPT | Performed by: OBSTETRICS & GYNECOLOGY

## 2025-08-21 RX ORDER — FLUCONAZOLE 150 MG/1
150 TABLET ORAL ONCE
Qty: 1 TABLET | Refills: 0 | Status: SHIPPED | OUTPATIENT
Start: 2025-08-21 | End: 2025-08-21

## 2025-08-21 SDOH — ECONOMIC STABILITY: FOOD INSECURITY: WITHIN THE PAST 12 MONTHS, THE FOOD YOU BOUGHT JUST DIDN'T LAST AND YOU DIDN'T HAVE MONEY TO GET MORE.: NEVER TRUE

## 2025-08-21 SDOH — ECONOMIC STABILITY: FOOD INSECURITY: WITHIN THE PAST 12 MONTHS, YOU WORRIED THAT YOUR FOOD WOULD RUN OUT BEFORE YOU GOT MONEY TO BUY MORE.: NEVER TRUE

## 2025-08-21 ASSESSMENT — PATIENT HEALTH QUESTIONNAIRE - PHQ9
2. FEELING DOWN, DEPRESSED OR HOPELESS: NOT AT ALL
SUM OF ALL RESPONSES TO PHQ QUESTIONS 1-9: 0
SUM OF ALL RESPONSES TO PHQ QUESTIONS 1-9: 0
3. TROUBLE FALLING OR STAYING ASLEEP: NOT AT ALL
4. FEELING TIRED OR HAVING LITTLE ENERGY: NOT AT ALL
7. TROUBLE CONCENTRATING ON THINGS, SUCH AS READING THE NEWSPAPER OR WATCHING TELEVISION: NOT AT ALL
SUM OF ALL RESPONSES TO PHQ QUESTIONS 1-9: 0
1. LITTLE INTEREST OR PLEASURE IN DOING THINGS: NOT AT ALL
SUM OF ALL RESPONSES TO PHQ QUESTIONS 1-9: 0
8. MOVING OR SPEAKING SO SLOWLY THAT OTHER PEOPLE COULD HAVE NOTICED. OR THE OPPOSITE, BEING SO FIGETY OR RESTLESS THAT YOU HAVE BEEN MOVING AROUND A LOT MORE THAN USUAL: NOT AT ALL
5. POOR APPETITE OR OVEREATING: NOT AT ALL
9. THOUGHTS THAT YOU WOULD BE BETTER OFF DEAD, OR OF HURTING YOURSELF: NOT AT ALL
10. IF YOU CHECKED OFF ANY PROBLEMS, HOW DIFFICULT HAVE THESE PROBLEMS MADE IT FOR YOU TO DO YOUR WORK, TAKE CARE OF THINGS AT HOME, OR GET ALONG WITH OTHER PEOPLE: NOT DIFFICULT AT ALL
6. FEELING BAD ABOUT YOURSELF - OR THAT YOU ARE A FAILURE OR HAVE LET YOURSELF OR YOUR FAMILY DOWN: NOT AT ALL

## 2025-08-26 LAB
CYTOLOGIST CVX/VAG CYTO: NORMAL
CYTOLOGY CVX/VAG DOC CYTO: NORMAL
CYTOLOGY CVX/VAG DOC THIN PREP: NORMAL
DX ICD CODE: NORMAL
HPV GENOTYPE REFLEX: NORMAL
HPV I/H RISK 4 DNA CVX QL PROBE+SIG AMP: NEGATIVE
OTHER STN SPEC: NORMAL
SERVICE CMNT-IMP: NORMAL
STAT OF ADQ CVX/VAG CYTO-IMP: NORMAL